# Patient Record
Sex: MALE | Race: WHITE | NOT HISPANIC OR LATINO | Employment: FULL TIME | URBAN - METROPOLITAN AREA
[De-identification: names, ages, dates, MRNs, and addresses within clinical notes are randomized per-mention and may not be internally consistent; named-entity substitution may affect disease eponyms.]

---

## 2017-02-06 RX ORDER — LISINOPRIL 5 MG/1
5 TABLET ORAL DAILY
COMMUNITY
End: 2018-06-29 | Stop reason: SDUPTHER

## 2017-02-07 ENCOUNTER — ANESTHESIA EVENT (OUTPATIENT)
Dept: GASTROENTEROLOGY | Facility: MEDICAL CENTER | Age: 57
End: 2017-02-07
Payer: COMMERCIAL

## 2017-02-08 ENCOUNTER — ANESTHESIA (OUTPATIENT)
Dept: GASTROENTEROLOGY | Facility: MEDICAL CENTER | Age: 57
End: 2017-02-08
Payer: COMMERCIAL

## 2017-02-08 ENCOUNTER — GENERIC CONVERSION - ENCOUNTER (OUTPATIENT)
Dept: GASTROENTEROLOGY | Facility: MEDICAL CENTER | Age: 57
End: 2017-02-08

## 2017-02-08 ENCOUNTER — HOSPITAL ENCOUNTER (OUTPATIENT)
Facility: MEDICAL CENTER | Age: 57
Setting detail: OUTPATIENT SURGERY
Discharge: HOME | End: 2017-02-08
Attending: INTERNAL MEDICINE | Admitting: INTERNAL MEDICINE
Payer: COMMERCIAL

## 2017-02-08 VITALS
TEMPERATURE: 97.4 F | BODY MASS INDEX: 29.4 KG/M2 | OXYGEN SATURATION: 96 % | SYSTOLIC BLOOD PRESSURE: 117 MMHG | DIASTOLIC BLOOD PRESSURE: 79 MMHG | RESPIRATION RATE: 16 BRPM | HEART RATE: 73 BPM | HEIGHT: 71 IN | WEIGHT: 210 LBS

## 2017-02-08 DIAGNOSIS — Z12.11 COLON CANCER SCREENING: Primary | ICD-10-CM

## 2017-02-08 RX ORDER — SODIUM CHLORIDE 9 MG/ML
125 INJECTION, SOLUTION INTRAVENOUS CONTINUOUS
Status: DISCONTINUED | OUTPATIENT
Start: 2017-02-08 | End: 2017-02-08 | Stop reason: HOSPADM

## 2017-02-08 RX ORDER — ONDANSETRON 2 MG/ML
4 INJECTION INTRAMUSCULAR; INTRAVENOUS ONCE
Status: DISCONTINUED | OUTPATIENT
Start: 2017-02-08 | End: 2017-02-08 | Stop reason: HOSPADM

## 2017-02-08 RX ORDER — PROPOFOL 10 MG/ML
INJECTION, EMULSION INTRAVENOUS AS NEEDED
Status: DISCONTINUED | OUTPATIENT
Start: 2017-02-08 | End: 2017-02-08 | Stop reason: SURG

## 2017-02-08 RX ADMIN — PROPOFOL 100 MG: 10 INJECTION, EMULSION INTRAVENOUS at 10:03

## 2017-02-08 RX ADMIN — PROPOFOL 50 MG: 10 INJECTION, EMULSION INTRAVENOUS at 10:16

## 2017-02-08 RX ADMIN — SODIUM CHLORIDE 125 ML/HR: 0.9 INJECTION, SOLUTION INTRAVENOUS at 09:21

## 2017-02-08 RX ADMIN — PROPOFOL 25 MG: 10 INJECTION, EMULSION INTRAVENOUS at 10:08

## 2017-02-08 RX ADMIN — PROPOFOL 25 MG: 10 INJECTION, EMULSION INTRAVENOUS at 10:14

## 2017-02-08 RX ADMIN — PROPOFOL 25 MG: 10 INJECTION, EMULSION INTRAVENOUS at 10:10

## 2017-02-08 RX ADMIN — PROPOFOL 25 MG: 10 INJECTION, EMULSION INTRAVENOUS at 10:12

## 2017-02-08 RX ADMIN — LIDOCAINE HYDROCHLORIDE 60 MG: 20 INJECTION, SOLUTION INTRAVENOUS at 10:03

## 2017-02-08 RX ADMIN — PROPOFOL 50 MG: 10 INJECTION, EMULSION INTRAVENOUS at 10:06

## 2017-06-07 ENCOUNTER — ALLSCRIPTS OFFICE VISIT (OUTPATIENT)
Dept: OTHER | Facility: OTHER | Age: 57
End: 2017-06-07

## 2017-06-07 ENCOUNTER — GENERIC CONVERSION - ENCOUNTER (OUTPATIENT)
Dept: OTHER | Facility: OTHER | Age: 57
End: 2017-06-07

## 2017-06-07 DIAGNOSIS — E78.5 HYPERLIPIDEMIA: ICD-10-CM

## 2017-06-07 DIAGNOSIS — R71.8 OTHER ABNORMALITY OF RED BLOOD CELLS: ICD-10-CM

## 2017-06-07 DIAGNOSIS — R94.31 ABNORMAL ELECTROCARDIOGRAM: ICD-10-CM

## 2017-06-12 ENCOUNTER — TRANSCRIBE ORDERS (OUTPATIENT)
Dept: ADMINISTRATIVE | Facility: HOSPITAL | Age: 57
End: 2017-06-12

## 2017-06-12 DIAGNOSIS — R94.31 NONSPECIFIC ABNORMAL ELECTROCARDIOGRAM (ECG) (EKG): Primary | ICD-10-CM

## 2017-07-06 ENCOUNTER — HOSPITAL ENCOUNTER (OUTPATIENT)
Dept: NON INVASIVE DIAGNOSTICS | Facility: CLINIC | Age: 57
Discharge: HOME | End: 2017-07-06
Payer: COMMERCIAL

## 2017-07-06 DIAGNOSIS — R94.31 NONSPECIFIC ABNORMAL ELECTROCARDIOGRAM (ECG) (EKG): ICD-10-CM

## 2017-07-06 LAB
ARRHY DURING EX: NORMAL
CHEST PAIN STATEMENT: NORMAL
MAX DIASTOLIC BP: 108 MMHG
MAX HEART RATE: 181 BPM
MAX PREDICTED HEART RATE: 166 BPM
MAX. SYSTOLIC BP: 200 MMHG
PROTOCOL NAME: NORMAL
REASON FOR TERMINATION: NORMAL
TARGET HR FORMULA: NORMAL
TIME IN EXERCISE PHASE: 583 S

## 2017-07-06 PROCEDURE — 93350 STRESS TTE ONLY: CPT

## 2017-07-27 ENCOUNTER — GENERIC CONVERSION - ENCOUNTER (OUTPATIENT)
Dept: OTHER | Facility: OTHER | Age: 57
End: 2017-07-27

## 2017-10-25 ENCOUNTER — GENERIC CONVERSION - ENCOUNTER (OUTPATIENT)
Dept: OTHER | Facility: OTHER | Age: 57
End: 2017-10-25

## 2017-10-25 DIAGNOSIS — D56.9 THALASSEMIA: ICD-10-CM

## 2017-10-25 DIAGNOSIS — I10 ESSENTIAL (PRIMARY) HYPERTENSION: ICD-10-CM

## 2017-10-25 DIAGNOSIS — R42 DIZZINESS AND GIDDINESS: ICD-10-CM

## 2018-01-14 VITALS
HEART RATE: 64 BPM | WEIGHT: 223.25 LBS | HEIGHT: 71 IN | OXYGEN SATURATION: 98 % | TEMPERATURE: 97.6 F | DIASTOLIC BLOOD PRESSURE: 76 MMHG | SYSTOLIC BLOOD PRESSURE: 114 MMHG | BODY MASS INDEX: 31.25 KG/M2

## 2018-01-22 VITALS
SYSTOLIC BLOOD PRESSURE: 120 MMHG | HEART RATE: 90 BPM | TEMPERATURE: 97.9 F | WEIGHT: 222 LBS | DIASTOLIC BLOOD PRESSURE: 84 MMHG | HEIGHT: 71 IN | OXYGEN SATURATION: 98 % | BODY MASS INDEX: 31.08 KG/M2

## 2018-04-11 ENCOUNTER — OFFICE VISIT (OUTPATIENT)
Dept: INTERNAL MEDICINE CLINIC | Facility: CLINIC | Age: 58
End: 2018-04-11
Payer: COMMERCIAL

## 2018-04-11 VITALS
HEIGHT: 72 IN | HEART RATE: 64 BPM | BODY MASS INDEX: 30.5 KG/M2 | WEIGHT: 225.2 LBS | DIASTOLIC BLOOD PRESSURE: 84 MMHG | SYSTOLIC BLOOD PRESSURE: 122 MMHG | OXYGEN SATURATION: 99 % | TEMPERATURE: 97.5 F

## 2018-04-11 DIAGNOSIS — D56.9 THALASSEMIA, UNSPECIFIED TYPE: ICD-10-CM

## 2018-04-11 DIAGNOSIS — D69.6 THROMBOCYTOPENIA (HCC): ICD-10-CM

## 2018-04-11 DIAGNOSIS — Z11.59 NEED FOR HEPATITIS C SCREENING TEST: Primary | ICD-10-CM

## 2018-04-11 DIAGNOSIS — E66.9 OBESITY (BMI 30.0-34.9): ICD-10-CM

## 2018-04-11 DIAGNOSIS — I10 ESSENTIAL HYPERTENSION: ICD-10-CM

## 2018-04-11 DIAGNOSIS — R79.89 HIGH SERUM HIGH DENSITY LIPOPROTEIN (HDL): ICD-10-CM

## 2018-04-11 PROBLEM — J06.9 ACUTE URI: Status: RESOLVED | Noted: 2017-10-25 | Resolved: 2018-04-11

## 2018-04-11 PROBLEM — R94.31 ST SEGMENT CHANGES ON ELECTROCARDIOGRAM: Status: RESOLVED | Noted: 2017-06-07 | Resolved: 2018-04-11

## 2018-04-11 PROBLEM — J06.9 ACUTE URI: Status: ACTIVE | Noted: 2017-10-25

## 2018-04-11 PROBLEM — R71.8 MICROCYTOSIS: Status: ACTIVE | Noted: 2017-06-07

## 2018-04-11 PROBLEM — R42 VERTIGO: Status: RESOLVED | Noted: 2017-10-25 | Resolved: 2018-04-11

## 2018-04-11 PROBLEM — R00.1 SINUS BRADYCARDIA: Status: ACTIVE | Noted: 2017-06-07

## 2018-04-11 PROBLEM — E66.811 OBESITY (BMI 30.0-34.9): Status: ACTIVE | Noted: 2018-04-11

## 2018-04-11 PROBLEM — J30.2 SEASONAL ALLERGIES: Status: ACTIVE | Noted: 2017-10-25

## 2018-04-11 PROBLEM — R00.1 SINUS BRADYCARDIA: Status: RESOLVED | Noted: 2017-06-07 | Resolved: 2018-04-11

## 2018-04-11 PROBLEM — R94.31 ST SEGMENT CHANGES ON ELECTROCARDIOGRAM: Status: ACTIVE | Noted: 2017-06-07

## 2018-04-11 PROBLEM — R42 VERTIGO: Status: ACTIVE | Noted: 2017-10-25

## 2018-04-11 PROCEDURE — 99214 OFFICE O/P EST MOD 30 MIN: CPT | Performed by: PHYSICIAN ASSISTANT

## 2018-04-11 RX ORDER — FLUTICASONE PROPIONATE 50 MCG
1 SPRAY, SUSPENSION (ML) NASAL DAILY
COMMUNITY
Start: 2017-10-25 | End: 2019-05-31 | Stop reason: ALTCHOICE

## 2018-04-11 NOTE — ASSESSMENT & PLAN NOTE
Patient with a history of thalassemia  Microcytosis without anemia  Platelets trending down slightly    Will obtain follow-up CBC with next lab set

## 2018-04-11 NOTE — ASSESSMENT & PLAN NOTE
Slight decreased platelets  Will check CBC with next labs that  No active signs or symptoms of bleed at this time

## 2018-04-11 NOTE — PROGRESS NOTES
[facility] [address] [place] [address]  Standard Office Visit  Patient ID: Sarahy Wells    : 1960  Age/Gender: 56 y o  male     DATE: 2018      Assessment/Plan:    Essential hypertension  At this time blood pressure is well controlled on lisinopril 5 mg daily  No changes to blood pressure medicines  Continue with low-salt diet and daily activity  Six-month follow-up with labs to be done prior    High serum high density lipoprotein (HDL)   Patient has a documented history of hyperlipidemia however his elevated total cholesterol is likely secondary to his increased HDL which is protest active to him  Will check lipids with next lab set no adjustment needed at this time  Continue with current low-fat diet and daily activity  Need for hepatitis C screening test    Due to patient's age being a baby boomer will check hep C screening antibody    Thalassemia   Patient with a history of thalassemia  Microcytosis without anemia  Platelets trending down slightly  Will obtain follow-up CBC with next lab set    Thrombocytopenia ([address] )   Slight decreased platelets  Will check CBC with next labs that  No active signs or symptoms of bleed at this time  Diagnoses and all orders for this visit:    Need for hepatitis C screening test  -     Hepatitis C antibody; Future    Thalassemia, unspecified type    High serum high density lipoprotein (HDL)  -     Comprehensive metabolic panel; Future  -     Lipid panel; Future    Essential hypertension  -     Comprehensive metabolic panel;  Future  -     CBC and differential; Future  -     Urinalysis with microscopic    Thrombocytopenia (HCC)  -     CBC and differential; Future    Obesity (BMI 30 0-34 9)    Other orders  -     fluticasone (FLONASE) 50 mcg/act nasal spray; 1 spray into each nostril daily          Subjective:   Chief Complaint   Patient presents with    Follow-up     blood work done 2018   Vicky Titus Hypertension         Tommy Gonsalves is a 56 y o  male who presents to the office on 4/11/2018 for     72-year-old male for follow-up  No complaints or concerns at this time  Notes he has continues to work full time  Has been more careful trying to get in and out of his truck but less stress on his knees  Notes his knee pain is well controlled  Taking medications as directed  Has had a recent colonoscopy  Had labs prior to today's visit  No new complaints or concerns at this time  Here today follow-up labs  Notes he is not interested in any vaccinations  Historically refuses vaccinations  Had echo stress test 07/2017 which was negative  History of ventricular peritoneal shunt  No headaches no dizziness  No imbalance issues  He notes he has been doing well  Hypertension   This is a chronic problem  The current episode started more than 1 year ago  The problem is controlled  Pertinent negatives include no anxiety, blurred vision, chest pain, headaches, palpitations, peripheral edema or shortness of breath  There are no associated agents to hypertension  Risk factors for coronary artery disease include dyslipidemia, family history, male gender and obesity  Past treatments include ACE inhibitors  There are no compliance problems  Heartburn   He reports no abdominal pain, no chest pain, no coughing, no heartburn, no nausea or no wheezing  Symptoms have fully resolved  Have not returned  Manages with diet and lifestyle modifications alone  Notes he previously did have symptoms but it was when he was drinking more than a pot of coffee a day  Resolved with diet changes          The following portions of the patient's history were reviewed and updated as appropriate: allergies, current medications, past family history, past medical history, past social history, past surgical history and problem list     Review of Systems   Eyes: Negative for blurred vision     Respiratory: Negative for cough, shortness of breath and wheezing  Cardiovascular: Negative for chest pain and palpitations  Gastrointestinal: Negative for abdominal pain, constipation, diarrhea, heartburn, nausea and vomiting  Genitourinary:        No urinary symptoms  Wakes on occasion at night if she drinks fluids before bed  No straining with urination  No pain with urination  Normal stream    Musculoskeletal:        Intermittent knee pain   Skin:        No bruising or bleeding   Neurological: Negative for dizziness, syncope, light-headedness, numbness and headaches  Patient Active Problem List   Diagnosis    Essential hypertension    High serum high density lipoprotein (HDL)    Hypopigmentation    Microcytosis    Seasonal allergies    Thalassemia    Need for hepatitis C screening test    Thrombocytopenia (HCC)    Obesity (BMI 30 0-34  9)       Past Medical History:   Diagnosis Date    Allergic     Allergic rhinitis     GERD (gastroesophageal reflux disease)     Hydrocephalus     Hypertension     Obesity (BMI 30 0-34  9) 4/11/2018    Thalassemia 10/25/2017       Past Surgical History:   Procedure Laterality Date    AL COLONOSCOPY FLX DX W/COLLJ SPEC WHEN PFRMD N/A 2/8/2017    Procedure: COLONOSCOPY;  Surgeon: Hezzie Cranker, MD;  Location: [facility];   Service: Gastroenterology    SHOULDER SURGERY      last assessed 3/8/2016    VENTRICULAR ATRIAL SHUNT      for hydrocephalus    VENTRICULOPERITONEAL SHUNT           Current Outpatient Prescriptions:     fluticasone (FLONASE) 50 mcg/act nasal spray, 1 spray into each nostril daily, Disp: , Rfl:     lisinopril (ZESTRIL) 5 mg tablet, Take 5 mg by mouth daily, Disp: , Rfl:     Allergies   Allergen Reactions    Pollen Extract        Social History     Social History    Marital status: /Civil Union     Spouse name: N/A    Number of children: N/A    Years of education: N/A     Social History Main Topics    Smoking status: Never Smoker    Smokeless tobacco: Never Used    Alcohol use Yes      Comment: occasional    Drug use: No    Sexual activity: Not Asked     Other Topics Concern    None     Social History Narrative    Activities:  Soccer       Family History   Problem Relation Age of Onset    Diabetes Mother      DM    Diabetes Father      DM    Hypertension Father     Diabetes Brother      DM    Heart disease Brother      problem    Liver disease Family      sibling    Lung cancer Family      Uncle    Diabetes type II Brother     Cirrhosis Brother     Diabetes type II Brother     Dialysis Brother        PHQ-9 Depression Screening    PHQ-9:    Frequency of the following problems over the past two weeks:              Health Maintenance   Topic Date Due    HIV SCREENING  1960    Hepatitis C Screening  1960    Depression Screening PHQ-9  06/07/2018    COLONOSCOPY  02/08/2022    DTaP,Tdap,and Td Vaccines (2 - Td) 04/11/2028    INFLUENZA VACCINE  Addressed       There is no immunization history for the selected administration types on file for this patient  Objective:  Vitals:    04/11/18 1242 04/11/18 1253   BP: 122/84    BP Location: Left arm    Patient Position: Sitting    Cuff Size: Large    Pulse: 64    Temp: 97 9 °F (36 6 °C) 97 5 °F (36 4 °C)   TempSrc: Oral    SpO2: 99%    Weight: 102 kg (225 lb 3 2 oz)    Height: 6' (1 829 m)      Wt Readings from Last 3 Encounters:   04/11/18 102 kg (225 lb 3 2 oz)   10/25/17 101 kg (222 lb)   06/07/17 101 kg (223 lb 4 oz)     Body mass index is 30 54 kg/m²  No exam data present       Physical Exam   Constitutional: He is oriented to person, place, and time  He appears well-developed and well-nourished  No distress  Alert pleasant cooperative seated in room in no acute distress   HENT:   Head: Normocephalic and atraumatic  Mouth/Throat: Oropharynx is clear and moist  No oropharyngeal exudate     Right parietal area  shunt site normal overlying skin, nontender   Eyes: Pupils are equal, round, and reactive to light  Right eye exhibits no discharge  Left eye exhibits no discharge  No scleral icterus  Pupils reactive   Neck: Neck supple  Cardiovascular: Normal rate, regular rhythm and normal heart sounds  No murmur heard  Pulmonary/Chest: Effort normal and breath sounds normal  No respiratory distress  He has no wheezes  Clear to auscultation no crackles rhonchi wheeze no respiratory distress   Abdominal: Soft  Bowel sounds are normal  There is no tenderness  There is no guarding  Positive bowel sounds soft non-tender nondistended   Musculoskeletal: He exhibits no edema  No lower extremity edema   Neurological: He is alert and oriented to person, place, and time  No gross focal neurologic deficits   Skin: Skin is warm and dry  He is not diaphoretic  Hypopigmented skin lesions bilateral hands and forearm  Psychiatric: He has a normal mood and affect  His behavior is normal  Judgment and thought content normal    Nursing note and vitals reviewed            Future Appointments  Date Time Provider Chaparrita Valdez   10/10/2018 9:00 AM Sheila Shoemaker PA-C [address] [place] [address] CARE [address] [place] [address]    Patient Care Team:  Neo Moon MD as PCP - General  Sosa Whipple MD

## 2018-04-11 NOTE — ASSESSMENT & PLAN NOTE
At this time blood pressure is well controlled on lisinopril 5 mg daily  No changes to blood pressure medicines  Continue with low-salt diet and daily activity    Six-month follow-up with labs to be done prior

## 2018-04-11 NOTE — PATIENT INSTRUCTIONS
Essential hypertension  At this time blood pressure is well controlled on lisinopril 5 mg daily  No changes to blood pressure medicines  Continue with low-salt diet and daily activity  Six-month follow-up with labs to be done prior    High serum high density lipoprotein (HDL)   Patient has a documented history of hyperlipidemia however his elevated total cholesterol is likely secondary to his increased HDL which is protest active to him  Will check lipids with next lab set no adjustment needed at this time  Continue with current low-fat diet and daily activity  Need for hepatitis C screening test    Due to patient's age being a baby boomer will check hep C screening antibody    Thalassemia   Patient with a history of thalassemia  Microcytosis without anemia  Platelets trending down slightly  Will obtain follow-up CBC with next lab set    Thrombocytopenia ([address] )   Slight decreased platelets  Will check CBC with next labs that  No active signs or symptoms of bleed at this time

## 2018-04-11 NOTE — ASSESSMENT & PLAN NOTE
Patient has a documented history of hyperlipidemia however his elevated total cholesterol is likely secondary to his increased HDL which is protest active to him  Will check lipids with next lab set no adjustment needed at this time  Continue with current low-fat diet and daily activity

## 2018-06-25 RX ORDER — LISINOPRIL 5 MG/1
TABLET ORAL
Qty: 30 TABLET | Refills: 5 | OUTPATIENT
Start: 2018-06-25

## 2018-06-29 DIAGNOSIS — I10 HYPERTENSION, UNSPECIFIED TYPE: Primary | ICD-10-CM

## 2018-06-29 RX ORDER — LISINOPRIL 5 MG/1
5 TABLET ORAL DAILY
Qty: 30 TABLET | Refills: 5 | Status: SHIPPED | OUTPATIENT
Start: 2018-06-29 | End: 2018-08-24 | Stop reason: SDUPTHER

## 2018-08-24 ENCOUNTER — OFFICE VISIT (OUTPATIENT)
Dept: INTERNAL MEDICINE CLINIC | Facility: CLINIC | Age: 58
End: 2018-08-24
Payer: COMMERCIAL

## 2018-08-24 VITALS
TEMPERATURE: 97.4 F | BODY MASS INDEX: 31.64 KG/M2 | HEART RATE: 96 BPM | OXYGEN SATURATION: 97 % | DIASTOLIC BLOOD PRESSURE: 92 MMHG | WEIGHT: 221 LBS | HEIGHT: 70 IN | SYSTOLIC BLOOD PRESSURE: 142 MMHG

## 2018-08-24 DIAGNOSIS — E66.9 OBESITY (BMI 30.0-34.9): Primary | ICD-10-CM

## 2018-08-24 DIAGNOSIS — I10 ESSENTIAL HYPERTENSION: ICD-10-CM

## 2018-08-24 DIAGNOSIS — I10 HYPERTENSION, UNSPECIFIED TYPE: ICD-10-CM

## 2018-08-24 PROCEDURE — 99213 OFFICE O/P EST LOW 20 MIN: CPT | Performed by: PHYSICIAN ASSISTANT

## 2018-08-24 RX ORDER — LISINOPRIL 10 MG/1
10 TABLET ORAL DAILY
Qty: 30 TABLET | Refills: 3 | Status: SHIPPED | OUTPATIENT
Start: 2018-08-24 | End: 2018-08-29 | Stop reason: SDUPTHER

## 2018-08-24 NOTE — ASSESSMENT & PLAN NOTE
Up until today blood pressure was previously well controlled with lisinopril 5 mg daily  He notes he has gained weight due to inactivity over the last few months and has been consuming large amounts of salt  He also has increased stress with upcoming trip to [facility]  Blood pressure was elevated at DO T physical as well as home blood pressure readings  He notes when he checks his blood pressure at home his blood pressure is around 140/mid 80's  Patient currently without any symptoms  Due to previous good control and multiple lifestyle modifications patient has already made, will increase lisinopril form 5 mg daily to 10 mg daily  Until you  new script, take two 5 mg tablets to make 10 mg dose  New script will be for 10 mg tablets and you will only need to take one of these  Discussed importance of low salt diet, daily activity  Monitor BP at home (patient has blood pressure cuff)  Avoid caffeine and stimulants  Discussed importance of  Getting sufficient amount of sleep per day  Keep blood pressure log of home numbers  Goal of BP is <130/<80  Discussed proper BP technique  Labs  were already ordered for 10/2018 visit and patient has script for this with  Advised him to go early next week for these labs before his appointment on Wednesday  Patient will follow up next week to review blood pressure log and consider possible further lisinopril dose adjustment pending log  Discussed red flag sx which need immediate eval and treat

## 2018-08-24 NOTE — PROGRESS NOTES
[facility] [address] [place] [address]  Standard Office Visit  Patient ID: Hansa Harrington    : 1960  Age/Gender: 56 y o  male     DATE: 2018      Assessment/Plan:    Essential hypertension   Up until today blood pressure was previously well controlled with lisinopril 5 mg daily  He notes he has gained weight due to inactivity over the last few months and has been consuming large amounts of salt  He also has increased stress with upcoming trip to [facility]  Blood pressure was elevated at DO T physical as well as home blood pressure readings  He notes when he checks his blood pressure at home his blood pressure is around 140/mid 80's  Patient currently without any symptoms  Due to previous good control and multiple lifestyle modifications patient has already made, will increase lisinopril form 5 mg daily to 10 mg daily  Until you  new script, take two 5 mg tablets to make 10 mg dose  New script will be for 10 mg tablets and you will only need to take one of these  Discussed importance of low salt diet, daily activity  Monitor BP at home (patient has blood pressure cuff)  Avoid caffeine and stimulants  Discussed importance of  Getting sufficient amount of sleep per day  Keep blood pressure log of home numbers  Goal of BP is <130/<80  Discussed proper BP technique  Labs  were already ordered for 10/2018 visit and patient has script for this with  Advised him to go early next week for these labs before his appointment on Wednesday  Patient will follow up next week to review blood pressure log and consider possible further lisinopril dose adjustment pending log  Discussed red flag sx which need immediate eval and treat  Diagnoses and all orders for this visit:    Obesity (BMI 30 0-34  9)    Essential hypertension    Hypertension, unspecified type  -     lisinopril (ZESTRIL) 10 mg tablet;  Take 1 tablet (10 mg total) by mouth daily Subjective:   Chief Complaint   Patient presents with    Hypertension     had DOT PE completed          Pedro Prado is a 56 y o  male who presents to the office on 8/24/2018 for     For follow up  Was for DOT physcial and was told that his BP is elevated  He notes he does not feel any different  He notes that he came today right from work  He notes that prior visit for DOT was about 2 weeks ago, 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  Wants to know if this played a role in BP being elevated  Has been eating a lot of olives as of late  Since last 2 weeks has been trying to reduce his BP with lifestyle changes, reduced salt in his diet  Previously put salt on anything  Does not know what his BP was a this DOT physical   Reduced coffee to 1 cup per day, decafe  He notes he is afraid of flying and notes he ahs a flight coming up 2 weeks from now  He notes the flight is always on his mind  Has been on lisinopril 5mg daily for last few years  Less active since shoulder surgery (left)  No new medications, herbals or supplements  He notes he used to play soccer several times a week and has gotten away from this as of late  Plans to eat more healthy and trying get back to his regular body weight  Has gained weight due to inactivity      Hypertension   This is a chronic problem  The current episode started more than 1 year ago  The problem has been gradually worsening since onset  The problem is uncontrolled  Pertinent negatives include no blurred vision, chest pain, headaches, malaise/fatigue, neck pain, palpitations, peripheral edema or shortness of breath  Risk factors for coronary artery disease include family history, male gender and obesity  Past treatments include ACE inhibitors  Compliance problems include diet and exercise  There is no history of angina, kidney disease, CAD/MI, CVA, heart failure or left ventricular hypertrophy   There is no history of a hypertension causing med  The following portions of the patient's history were reviewed and updated as appropriate: allergies, current medications, past family history, past medical history, past social history, past surgical history and problem list     Review of Systems   Constitutional: Negative for malaise/fatigue  Eyes: Negative for blurred vision  Respiratory: Negative for shortness of breath and wheezing  Cardiovascular: Negative for chest pain and palpitations  Gastrointestinal: Negative for abdominal pain, constipation, diarrhea, nausea and vomiting  Musculoskeletal: Negative for neck pain  Skin:        Has unchanged hypopigmentation on arms for some time, dx with vitelago   Neurological: Negative for dizziness, tremors, syncope, light-headedness, numbness and headaches  Patient Active Problem List   Diagnosis    Essential hypertension    High serum high density lipoprotein (HDL)    Hypopigmentation    Microcytosis    Seasonal allergies    Thalassemia    Need for hepatitis C screening test    Thrombocytopenia (HCC)    Obesity (BMI 30 0-34  9)       Past Medical History:   Diagnosis Date    Allergic     Allergic rhinitis     GERD (gastroesophageal reflux disease)     Hydrocephalus     Hypertension     Obesity (BMI 30 0-34  9) 4/11/2018    Thalassemia 10/25/2017       Past Surgical History:   Procedure Laterality Date    DE COLONOSCOPY FLX DX W/COLLJ SPEC WHEN PFRMD N/A 2/8/2017    Procedure: COLONOSCOPY;  Surgeon: Abigail De La Cruz MD;  Location: [facility] GI LAB;   Service: Gastroenterology    SHOULDER SURGERY      last assessed 3/8/2016    VENTRICULAR ATRIAL SHUNT      for hydrocephalus    VENTRICULOPERITONEAL SHUNT           Current Outpatient Prescriptions:     lisinopril (ZESTRIL) 10 mg tablet, Take 1 tablet (10 mg total) by mouth daily, Disp: 30 tablet, Rfl: 3    fluticasone (FLONASE) 50 mcg/act nasal spray, 1 spray into each nostril daily, Disp: , Rfl:     Allergies   Allergen Reactions    Pollen Extract        Social History     Social History    Marital status: /Civil Union     Spouse name: N/A    Number of children: N/A    Years of education: N/A     Social History Main Topics    Smoking status: Never Smoker    Smokeless tobacco: Never Used    Alcohol use Yes      Comment: occasional    Drug use: No    Sexual activity: Not Asked     Other Topics Concern    None     Social History Narrative    Activities:  Soccer       Family History   Problem Relation Age of Onset    Diabetes Mother         DM    Diabetes Father         DM    Hypertension Father     Diabetes Brother         DM    Heart disease Brother         problem    Liver disease Family         sibling    Lung cancer Family         Uncle    Diabetes type II Brother     Cirrhosis Brother     Diabetes type II Brother     Dialysis Brother        PHQ-9 Depression Screening    PHQ-9:    Frequency of the following problems over the past two weeks:              Health Maintenance   Topic Date Due    Pneumococcal PPSV23 Highest Risk Adult (1 of 3 - PCV13) 11/22/1981    Depression Screening PHQ-9  06/07/2018    INFLUENZA VACCINE  09/01/2018    CRC Screening: Colonoscopy  02/08/2022    DTaP,Tdap,and Td Vaccines (2 - Td) 04/11/2028       There is no immunization history for the selected administration types on file for this patient       Objective:  Vitals:    08/24/18 1149 08/24/18 1203 08/24/18 1219 08/24/18 1220   BP: (!) 148/102 152/98 146/64 142/92   BP Location: Right arm Left arm Left arm Right arm   Patient Position: Sitting Sitting Sitting Sitting   Cuff Size: Adult Standard Extra-Large Extra-Large   Pulse: 96      Temp: (!) 97 4 °F (36 3 °C)      TempSrc: Tympanic      SpO2: 97%      Weight: 100 kg (221 lb)      Height: 5' 10" (1 778 m)        Wt Readings from Last 3 Encounters:   08/24/18 100 kg (221 lb)   04/11/18 102 kg (225 lb 3 2 oz)   10/25/17 101 kg (222 lb)     Body mass index is 31 71 kg/m²  No exam data present       Physical Exam   Constitutional: He is oriented to person, place, and time  He appears well-developed and well-nourished  No distress  Alert pleasant cooperative  Seated in room in no acute distress   HENT:   Head: Normocephalic and atraumatic  Right Ear: External ear normal    Left Ear: External ear normal    Mouth/Throat: Oropharynx is clear and moist  No oropharyngeal exudate  Right scalp  shunt in place  Nontender   Eyes: Conjunctivae are normal  Pupils are equal, round, and reactive to light  Right eye exhibits no discharge  Left eye exhibits no discharge  No scleral icterus  Pupils reactive   Neck: Neck supple  Cardiovascular: Normal rate, regular rhythm and normal heart sounds  No murmur heard  Pulmonary/Chest: Effort normal and breath sounds normal  No respiratory distress  He has no wheezes  He has no rales  Clear to auscultation throughout  No crackles no rhonchi no wheeze  No respiratory distress   Abdominal: Soft  Bowel sounds are normal  There is no tenderness  Soft nontender nondistended  Positive bowel sounds  No bruits   Musculoskeletal: He exhibits no edema  Neurological: He is alert and oriented to person, place, and time  No gross focal neurologic deficits on exam   Skin: Skin is warm and dry  He is not diaphoretic  Hypopigmented lesion on  Skin bilateral upper extremities   Psychiatric: He has a normal mood and affect  His behavior is normal  Thought content normal    Nursing note and vitals reviewed  Future Appointments  Date Time Provider Chaparrita Valdez   8/29/2018 9:00 AM Jessica [facility] [address]   10/12/2018 1:15 PM Umang Rader PA-C [address] [place] [address]    Patient Care Team:  Petey Calvo MD as PCP - General  Jimenez Jaquez MD

## 2018-08-24 NOTE — PATIENT INSTRUCTIONS
Essential hypertension  Increase lisinopril form 5 mg daily to 10 mg daily  Until you  new script, take two 5 mg tablets to make 10 mg dose  New script will be for 10 mg tablets and you will only need to take one of these  Discussed importance of low salt diet, daily activity  Monitor BP at home  Avoid caffeine and stimulants  Discussed importance of rest   Monitor BP at home  Goal of BP is <130/<80  Labs already ordered for 10/2018 visit  Advised him to go early next week for these labs before his appointment on Wednesday

## 2018-08-29 ENCOUNTER — OFFICE VISIT (OUTPATIENT)
Dept: INTERNAL MEDICINE CLINIC | Facility: CLINIC | Age: 58
End: 2018-08-29
Payer: COMMERCIAL

## 2018-08-29 VITALS
OXYGEN SATURATION: 99 % | HEIGHT: 70 IN | TEMPERATURE: 97.8 F | SYSTOLIC BLOOD PRESSURE: 138 MMHG | HEART RATE: 61 BPM | WEIGHT: 219.2 LBS | BODY MASS INDEX: 31.38 KG/M2 | DIASTOLIC BLOOD PRESSURE: 82 MMHG

## 2018-08-29 DIAGNOSIS — I10 HYPERTENSION, UNSPECIFIED TYPE: ICD-10-CM

## 2018-08-29 DIAGNOSIS — I10 ESSENTIAL HYPERTENSION: Primary | ICD-10-CM

## 2018-08-29 PROCEDURE — 3075F SYST BP GE 130 - 139MM HG: CPT | Performed by: NURSE PRACTITIONER

## 2018-08-29 PROCEDURE — 3079F DIAST BP 80-89 MM HG: CPT | Performed by: NURSE PRACTITIONER

## 2018-08-29 PROCEDURE — 99213 OFFICE O/P EST LOW 20 MIN: CPT | Performed by: NURSE PRACTITIONER

## 2018-08-29 RX ORDER — LISINOPRIL 10 MG/1
10 TABLET ORAL DAILY
Qty: 30 TABLET | Refills: 5 | Status: SHIPPED | OUTPATIENT
Start: 2018-08-29 | End: 2019-01-07 | Stop reason: SDUPTHER

## 2018-08-29 NOTE — ASSESSMENT & PLAN NOTE
Patient is to continue to take lisinopril 10 mg tablet daily, manual blood pressure 130/80  Patient will be leaving for vacation  And want to hold off on increasing blood pressure medications at this time  Will consider increasing lisinopril to 20 mg at follow-up appointment in 2 months if patient's blood pressure is elevated  Discussed and symptoms of stroke with patient and when he should report to the emergency room  Continue to monitor blood pressure at home  Goal BP is < 130/80  Contact our office for consistent elevations  Recommend low sodium diet  Exercise 30 minutes three times a week as tolerated    Recommend yearly eye exam

## 2018-08-29 NOTE — PROGRESS NOTES
Assessment/Plan:    Essential hypertension   Patient is to continue to take lisinopril 10 mg tablet daily, manual blood pressure 130/80  Patient will be leaving for vacation  And want to hold off on increasing blood pressure medications at this time  Will consider increasing lisinopril to 20 mg at follow-up appointment in 2 months if patient's blood pressure is elevated  Discussed and symptoms of stroke with patient and when he should report to the emergency room  Continue to monitor blood pressure at home  Goal BP is < 130/80  Contact our office for consistent elevations  Recommend low sodium diet  Exercise 30 minutes three times a week as tolerated  Recommend yearly eye exam           Diagnoses and all orders for this visit:    Essential hypertension    Hypertension, unspecified type  -     lisinopril (ZESTRIL) 10 mg tablet; Take 1 tablet (10 mg total) by mouth daily          Subjective:      Patient ID: Lawyer Severs is a 56 y o  male  Patient presents today for follow-up on his blood pressure  Patient was seen on 08/24/2018 by Lukasz Quiroz, who increased the patient's lisinopril from 5 mg to 10 mg  Patient has been taking 10 mg the past few days, and did monitor his blood pressures at home he got readings of 127/84 and 135/82  Manual blood pressure in the office today 130/80  patient does state that he has been gaining weight due to inactivity over the past 2 months, as his job has been consuming most of his time  Patient had also reports consuming large amounts of salt  He also states that he has increased stress due to an upcoming trip to [facility]  Hypertension   This is a chronic problem  The current episode started more than 1 year ago  The problem has been waxing and waning since onset  The problem is controlled  Pertinent negatives include no anxiety, blurred vision, chest pain, headaches, neck pain, orthopnea, palpitations, peripheral edema or shortness of breath   Risk factors for coronary artery disease include male gender, obesity and dyslipidemia  Treatments tried:   Lisinopril  The current treatment provides moderate improvement  Compliance problems include diet and exercise  The following portions of the patient's history were reviewed and updated as appropriate: allergies, current medications, past family history, past medical history, past social history, past surgical history and problem list     Review of Systems   Constitutional: Negative for activity change, appetite change, chills, diaphoresis and fever  HENT: Negative for congestion, ear discharge, ear pain, postnasal drip, rhinorrhea, sinus pain, sinus pressure and sore throat  Eyes: Negative for blurred vision, pain, discharge, itching and visual disturbance  Respiratory: Negative for cough, chest tightness, shortness of breath and wheezing  Cardiovascular: Negative for chest pain, palpitations, orthopnea and leg swelling  Gastrointestinal: Negative for abdominal pain, blood in stool, constipation, diarrhea, nausea and vomiting  Endocrine: Negative for polydipsia, polyphagia and polyuria  Genitourinary: Negative for difficulty urinating, dysuria, hematuria and urgency  Musculoskeletal: Negative for arthralgias, back pain and neck pain  Skin: Negative for rash and wound  Neurological: Negative for dizziness, weakness, numbness and headaches  Past Medical History:   Diagnosis Date    Allergic     Allergic rhinitis     GERD (gastroesophageal reflux disease)     Hydrocephalus     Hypertension     Obesity (BMI 30 0-34  9) 4/11/2018    Thalassemia 10/25/2017         Current Outpatient Prescriptions:     fluticasone (FLONASE) 50 mcg/act nasal spray, 1 spray into each nostril daily, Disp: , Rfl:     lisinopril (ZESTRIL) 10 mg tablet, Take 1 tablet (10 mg total) by mouth daily, Disp: 30 tablet, Rfl: 5    Allergies   Allergen Reactions    Pollen Extract        Social History Past Surgical History:   Procedure Laterality Date    NH COLONOSCOPY FLX DX W/COLLJ SPEC WHEN PFRMD N/A 2/8/2017    Procedure: COLONOSCOPY;  Surgeon: Nam Woo MD;  Location: [facility]; Service: Gastroenterology    SHOULDER SURGERY      last assessed 3/8/2016    VENTRICULAR ATRIAL SHUNT      for hydrocephalus    VENTRICULOPERITONEAL SHUNT       Family History   Problem Relation Age of Onset    Diabetes Mother         DM    Diabetes Father         DM    Hypertension Father     Diabetes Brother         DM    Heart disease Brother         problem    Liver disease Family         sibling    Lung cancer Family         Uncle    Diabetes type II Brother     Cirrhosis Brother     Diabetes type II Brother     Dialysis Brother        Objective:  /82 (BP Location: Left arm, Patient Position: Sitting, Cuff Size: Adult)   Pulse 61   Temp 97 8 °F (36 6 °C) (Oral)   Ht 5' 10" (1 778 m)   Wt 99 4 kg (219 lb 3 2 oz)   SpO2 99% Comment: room air  BMI 31 45 kg/m²     No results found for this or any previous visit (from the past 1344 hour(s))  Physical Exam   Constitutional: He is oriented to person, place, and time  He appears well-developed and well-nourished  No distress  HENT:   Head: Normocephalic and atraumatic  Right Ear: External ear normal    Left Ear: External ear normal    Nose: Nose normal    Mouth/Throat: Oropharynx is clear and moist  No oropharyngeal exudate  Eyes: Conjunctivae and EOM are normal  Pupils are equal, round, and reactive to light  Right eye exhibits no discharge  Left eye exhibits no discharge  Neck: Normal range of motion  Neck supple  No thyromegaly present  Cardiovascular: Normal rate, regular rhythm, normal heart sounds and intact distal pulses  Exam reveals no gallop and no friction rub  No murmur heard  Pulmonary/Chest: Effort normal and breath sounds normal  No stridor  No respiratory distress  He has no wheezes  He has no rales     Abdominal: Soft  Bowel sounds are normal  He exhibits no distension  There is no tenderness  Lymphadenopathy:     He has no cervical adenopathy  Neurological: He is alert and oriented to person, place, and time  Skin: Skin is warm and dry  No rash noted  He is not diaphoretic  No erythema  Hypopigmented skin to head and bilateral extremities   Psychiatric: He has a normal mood and affect   His behavior is normal  Judgment and thought content normal

## 2018-09-17 LAB — HCV AB SER-ACNC: NEGATIVE

## 2018-10-12 ENCOUNTER — TELEPHONE (OUTPATIENT)
Dept: INTERNAL MEDICINE CLINIC | Facility: CLINIC | Age: 58
End: 2018-10-12

## 2018-10-12 ENCOUNTER — OFFICE VISIT (OUTPATIENT)
Dept: INTERNAL MEDICINE CLINIC | Facility: CLINIC | Age: 58
End: 2018-10-12
Payer: COMMERCIAL

## 2018-10-12 VITALS
HEART RATE: 65 BPM | TEMPERATURE: 97.7 F | HEIGHT: 71 IN | SYSTOLIC BLOOD PRESSURE: 136 MMHG | OXYGEN SATURATION: 98 % | WEIGHT: 222.6 LBS | BODY MASS INDEX: 31.16 KG/M2 | DIASTOLIC BLOOD PRESSURE: 82 MMHG

## 2018-10-12 DIAGNOSIS — E66.9 OBESITY (BMI 30.0-34.9): ICD-10-CM

## 2018-10-12 DIAGNOSIS — D56.9 THALASSEMIA, UNSPECIFIED TYPE: ICD-10-CM

## 2018-10-12 DIAGNOSIS — Z11.59 NEED FOR HEPATITIS C SCREENING TEST: ICD-10-CM

## 2018-10-12 DIAGNOSIS — R79.89 HIGH SERUM HIGH DENSITY LIPOPROTEIN (HDL): ICD-10-CM

## 2018-10-12 DIAGNOSIS — I10 ESSENTIAL HYPERTENSION: Primary | ICD-10-CM

## 2018-10-12 DIAGNOSIS — D69.6 THROMBOCYTOPENIA (HCC): ICD-10-CM

## 2018-10-12 PROCEDURE — 99213 OFFICE O/P EST LOW 20 MIN: CPT | Performed by: PHYSICIAN ASSISTANT

## 2018-10-12 PROCEDURE — 1036F TOBACCO NON-USER: CPT | Performed by: PHYSICIAN ASSISTANT

## 2018-10-12 NOTE — ASSESSMENT & PLAN NOTE
BP stable at this time with home readings  Discussed importance of home BP checks  Discussed BP goal   Encouraged weight loss

## 2018-10-12 NOTE — ASSESSMENT & PLAN NOTE
Discussed lipid panel  Encouraged low fat low cholesterol diet and daily activity  Discussed addition of OTC phytosterol which can help to improve cholesterol  Encouraged weight loss

## 2018-10-12 NOTE — PROGRESS NOTES
[facility]  Standard Office Visit  Patient ID: Faby Lara    : 1960  Age/Gender: 56 y o  male     DATE: 10/12/2018      Assessment/Plan:    Need for hepatitis C screening test  Hep c screening negative 18    Thrombocytopenia (HCC)  Ptl improved on recent labs to 149  Will resolve    High serum high density lipoprotein (HDL)  Discussed lipid panel  Encouraged low fat low cholesterol diet and daily activity  Discussed addition of OTC phytosterol which can help to improve cholesterol  Encouraged weight loss  Obesity (BMI 30 0-34 9)  222 from 219 lbs  Encouraged weight loss to help with BP and cholesterol  Essential hypertension  BP stable at this time with home readings  Discussed importance of home BP checks  Discussed BP goal   Encouraged weight loss  Thalassemia  Repeat CBC reviewed  Ptl returned to normal   Will discuss CBC further with Dr Matilda Fierro as nucleated RBC newly seen on recent CBC  Labs ordered prior to follow up  Diagnoses and all orders for this visit:    Essential hypertension  -     Comprehensive metabolic panel; Future    Thrombocytopenia (HCC)    Need for hepatitis C screening test    High serum high density lipoprotein (HDL)    Obesity (BMI 30 0-34 9)  -     CBC and differential; Future  -     Comprehensive metabolic panel; Future    Thalassemia, unspecified type  -     CBC and differential; Future          Subjective:   Chief Complaint   Patient presents with    Hypertension     Pt is here today for HTN 6X month follow-up         Faby Lara is a 56 y o  male who presents to the office on 10/12/2018 for     Follow up for labs and BP  He notes he is checking his BP at home with improved control  Taking lisinopril as directed  With home readings:  Systolic low 894'K  Diastolic mid to lower 73'G     Has trip to [place] this summer  Feeling well at this time    No complaints or concerns  No HA or dizziness  He notes he will be starting back up with soccer to get back in shape  Has his daughters wedding coming up and he and his wife want to try to get in better health  Hypertension   This is a chronic problem  The current episode started more than 1 year ago  The problem is unchanged  The problem is controlled  Pertinent negatives include no blurred vision, chest pain, headaches, malaise/fatigue, palpitations, peripheral edema or shortness of breath  Risk factors for coronary artery disease include male gender and obesity  Past treatments include ACE inhibitors  The following portions of the patient's history were reviewed and updated as appropriate: allergies, current medications, past family history, past medical history, past social history, past surgical history and problem list     Review of Systems   Constitutional: Negative for fever, malaise/fatigue and unexpected weight change  Eyes: Negative for blurred vision and visual disturbance  Respiratory: Negative for cough, shortness of breath and wheezing  Cardiovascular: Negative for chest pain and palpitations  Gastrointestinal: Negative for abdominal pain, constipation, diarrhea, nausea and vomiting  Genitourinary: Negative for dysuria  Neurological: Negative for dizziness, syncope, weakness, light-headedness and headaches  Psychiatric/Behavioral: Negative for agitation  Patient Active Problem List   Diagnosis    Essential hypertension    High serum high density lipoprotein (HDL)    Hypopigmentation    Microcytosis    Seasonal allergies    Thalassemia    Obesity (BMI 30 0-34  9)       Past Medical History:   Diagnosis Date    Allergic     Allergic rhinitis     GERD (gastroesophageal reflux disease)     Hydrocephalus     Hypertension     Obesity (BMI 30 0-34  9) 4/11/2018    Thalassemia 10/25/2017       Past Surgical History:   Procedure Laterality Date    [place] COLONOSCOPY FLX DX W/COLLJ [facility] INPATIENT [facility]IT[facility] WHEN PFRMD N/A 2/8/2017    Procedure: COLONOSCOPY;  Surgeon: Nova Boyd MD;  Location: [facility]; Service: Gastroenterology    SHOULDER SURGERY      last assessed 3/8/2016    VENTRICULAR ATRIAL SHUNT      for hydrocephalus    VENTRICULOPERITONEAL SHUNT           Current Outpatient Prescriptions:     lisinopril (ZESTRIL) 10 mg tablet, Take 1 tablet (10 mg total) by mouth daily, Disp: 30 tablet, Rfl: 5    fluticasone (FLONASE) 50 mcg/act nasal spray, 1 spray into each nostril daily, Disp: , Rfl:     Allergies   Allergen Reactions    Pollen Extract        Social History     Social History    Marital status: /Civil Union     Spouse name: N/A    Number of children: N/A    Years of education: N/A     Social History Main Topics    Smoking status: Never Smoker    Smokeless tobacco: Never Used    Alcohol use Yes      Comment: occasionally    Drug use: No    Sexual activity: Not Asked     Other Topics Concern    None     Social History Narrative    Activities:  Soccer       Family History   Problem Relation Age of Onset    Diabetes Mother         DM    Diabetes Father         DM    Hypertension Father     Diabetes Brother         DM    Heart disease Brother         problem    Liver disease Family         sibling    Lung cancer Family         Uncle    Diabetes type II Brother     Cirrhosis Brother     Diabetes type II Brother     Dialysis Brother        PHQ-9 Depression Screening    PHQ-9:    Frequency of the following problems over the past two weeks:              Health Maintenance   Topic Date Due    Pneumococcal PPSV23 Highest Risk Adult (1 of 3 - PCV13) 11/22/1979    INFLUENZA VACCINE  07/01/2018    Depression Screening PHQ  08/29/2019    CRC Screening: Colonoscopy  02/08/2022    DTaP,Tdap,and Td Vaccines (2 - Td) 04/11/2028       There is no immunization history for the selected administration types on file for this patient       Objective:  Vitals:    10/12/18 1256 10/12/18 1315   BP: 140/84 136/82   BP Location: Left arm Left arm   Patient Position: Sitting Sitting   Cuff Size: Adult    Pulse: 65    Temp: 97 7 °F (36 5 °C)    TempSrc: Oral    SpO2: 98%    Weight: 101 kg (222 lb 9 6 oz)    Height: 5' 10 5" (1 791 m)      Wt Readings from Last 3 Encounters:   10/12/18 101 kg (222 lb 9 6 oz)   08/29/18 99 4 kg (219 lb 3 2 oz)   08/24/18 100 kg (221 lb)     Body mass index is 31 49 kg/m²  No exam data present       Physical Exam   Constitutional: He is oriented to person, place, and time  He appears well-developed and well-nourished  No distress  Alert, pleasant cooperative, seated in room in NAD   HENT:   Head: Normocephalic and atraumatic  Mouth/Throat: Oropharynx is clear and moist  No oropharyngeal exudate  Port right scalp, non-tender, normal overlying skin   Eyes: Pupils are equal, round, and reactive to light  Right eye exhibits no discharge  Left eye exhibits no discharge  No scleral icterus  Neck: Neck supple  Cardiovascular: Normal rate, regular rhythm and normal heart sounds  No murmur heard  Pulmonary/Chest: Effort normal and breath sounds normal  No respiratory distress  He has no wheezes  He has no rales  CTA throughout  NO crackles, rhonchi, wheeze  No resp distress   Abdominal: Soft  Bowel sounds are normal  There is no tenderness  There is no guarding  Soft, NT/ND   +BS   Musculoskeletal: He exhibits no edema  No LE edema  Neurological: He is alert and oriented to person, place, and time  No gross focal neuro deficits on exam   Skin: Skin is warm and dry  He is not diaphoretic  Hypopigmented skin on extremities   Psychiatric: He has a normal mood and affect  His behavior is normal  Thought content normal    Nursing note and vitals reviewed            Future Appointments  Date Time Provider Chaparrita Valdez   4/19/2019 1:15 PM Kylah Lockwood PA-C [address] [place] [address] [facility]    Patient Care Team:  Kerry Lynn PA-C as PCP - General (Internal Medicine)  Lynn Aguayo MD

## 2018-10-12 NOTE — TELEPHONE ENCOUNTER
Could you please take a look at CBC from 9/17/18 showing nucleated RBC  Patient with a history of thalassemia  Please advise if any further workup is needed for this finding on CBC    Thanks Rex Maurice

## 2018-10-12 NOTE — PATIENT INSTRUCTIONS
Need for hepatitis C screening test  Hep c screening negative 9/17/18    Thrombocytopenia (HCC)  Ptl improved on recent labs to 149  Will resolve    High serum high density lipoprotein (HDL)  Discussed lipid panel  Encouraged low fat low cholesterol dier and daily activity  Discussed addition of OTC phytosterol which can help to improve cholesterol  Encouraged weight loss  Obesity (BMI 30 0-34 9)  222 from 219 lbs  Encouraged weight loss to help with BP and cholesterol

## 2018-10-12 NOTE — ASSESSMENT & PLAN NOTE
Repeat CBC reviewed  Ptl returned to normal   Will discuss CBC further with Dr Alejandro Garcia as nucleated RBC newly seen on recent CBC  Labs ordered prior to follow up

## 2018-11-02 DIAGNOSIS — R71.8 MICROCYTOSIS: ICD-10-CM

## 2018-11-02 DIAGNOSIS — R79.89 ABNORMAL CBC: ICD-10-CM

## 2018-11-02 DIAGNOSIS — D56.9 THALASSEMIA, UNSPECIFIED TYPE: Primary | ICD-10-CM

## 2018-11-02 NOTE — TELEPHONE ENCOUNTER
Could you please contact patient  Needs repeat CBC in 8 weeks  To follow up his last CBC    Thanks Usama Shelton

## 2018-11-05 NOTE — TELEPHONE ENCOUNTER
Spoke to patient and informed him he needs repeat CBC on our about 11/12    Will mail requisition as per pt request

## 2019-01-07 DIAGNOSIS — I10 HYPERTENSION, UNSPECIFIED TYPE: ICD-10-CM

## 2019-01-07 RX ORDER — LISINOPRIL 10 MG/1
10 TABLET ORAL DAILY
Qty: 90 TABLET | Refills: 1 | Status: SHIPPED | OUTPATIENT
Start: 2019-01-07 | End: 2020-03-05 | Stop reason: SDUPTHER

## 2019-04-19 ENCOUNTER — OFFICE VISIT (OUTPATIENT)
Dept: INTERNAL MEDICINE CLINIC | Age: 59
End: 2019-04-19
Payer: COMMERCIAL

## 2019-04-19 VITALS
BODY MASS INDEX: 31.21 KG/M2 | OXYGEN SATURATION: 98 % | HEART RATE: 76 BPM | DIASTOLIC BLOOD PRESSURE: 86 MMHG | SYSTOLIC BLOOD PRESSURE: 132 MMHG | TEMPERATURE: 97.6 F | WEIGHT: 220.6 LBS

## 2019-04-19 DIAGNOSIS — M54.50 LOW BACK PAIN WITHOUT SCIATICA, UNSPECIFIED BACK PAIN LATERALITY, UNSPECIFIED CHRONICITY: ICD-10-CM

## 2019-04-19 DIAGNOSIS — D56.9 THALASSEMIA, UNSPECIFIED TYPE: ICD-10-CM

## 2019-04-19 DIAGNOSIS — I10 ESSENTIAL HYPERTENSION: ICD-10-CM

## 2019-04-19 DIAGNOSIS — Z23 NEED FOR VACCINATION FOR STREP PNEUMONIAE: Primary | ICD-10-CM

## 2019-04-19 DIAGNOSIS — Z13.220 LIPID SCREENING: ICD-10-CM

## 2019-04-19 DIAGNOSIS — M62.830 SPASM OF THORACIC BACK MUSCLE: ICD-10-CM

## 2019-04-19 PROBLEM — R71.8 MICROCYTOSIS: Status: RESOLVED | Noted: 2017-06-07 | Resolved: 2019-04-19

## 2019-04-19 PROCEDURE — 99214 OFFICE O/P EST MOD 30 MIN: CPT | Performed by: PHYSICIAN ASSISTANT

## 2019-04-19 RX ORDER — METHOCARBAMOL 500 MG/1
500 TABLET, FILM COATED ORAL DAILY PRN
Qty: 7 TABLET | Refills: 0 | Status: SHIPPED | OUTPATIENT
Start: 2019-04-19 | End: 2019-05-31 | Stop reason: ALTCHOICE

## 2019-05-01 ENCOUNTER — APPOINTMENT (OUTPATIENT)
Dept: LAB | Age: 59
End: 2019-05-01
Payer: COMMERCIAL

## 2019-05-01 ENCOUNTER — APPOINTMENT (OUTPATIENT)
Dept: RADIOLOGY | Age: 59
End: 2019-05-01
Payer: COMMERCIAL

## 2019-05-01 DIAGNOSIS — Z13.220 LIPID SCREENING: ICD-10-CM

## 2019-05-01 DIAGNOSIS — I10 ESSENTIAL HYPERTENSION: ICD-10-CM

## 2019-05-01 DIAGNOSIS — M54.50 LOW BACK PAIN WITHOUT SCIATICA, UNSPECIFIED BACK PAIN LATERALITY, UNSPECIFIED CHRONICITY: ICD-10-CM

## 2019-05-01 DIAGNOSIS — D56.9 THALASSEMIA, UNSPECIFIED TYPE: ICD-10-CM

## 2019-05-01 DIAGNOSIS — M62.830 SPASM OF THORACIC BACK MUSCLE: ICD-10-CM

## 2019-05-01 LAB
ALBUMIN SERPL BCP-MCNC: 4.1 G/DL (ref 3.5–5)
ALP SERPL-CCNC: 47 U/L (ref 46–116)
ALT SERPL W P-5'-P-CCNC: 50 U/L (ref 12–78)
ANION GAP SERPL CALCULATED.3IONS-SCNC: 4 MMOL/L (ref 4–13)
AST SERPL W P-5'-P-CCNC: 26 U/L (ref 5–45)
BASOPHILS # BLD AUTO: 0.02 THOUSANDS/ΜL (ref 0–0.1)
BASOPHILS NFR BLD AUTO: 1 % (ref 0–1)
BILIRUB SERPL-MCNC: 0.74 MG/DL (ref 0.2–1)
BUN SERPL-MCNC: 20 MG/DL (ref 5–25)
CALCIUM SERPL-MCNC: 9.1 MG/DL (ref 8.3–10.1)
CHLORIDE SERPL-SCNC: 102 MMOL/L (ref 100–108)
CHOLEST SERPL-MCNC: 238 MG/DL (ref 50–200)
CO2 SERPL-SCNC: 29 MMOL/L (ref 21–32)
CREAT SERPL-MCNC: 1.06 MG/DL (ref 0.6–1.3)
EOSINOPHIL # BLD AUTO: 0.13 THOUSAND/ΜL (ref 0–0.61)
EOSINOPHIL NFR BLD AUTO: 3 % (ref 0–6)
ERYTHROCYTE [DISTWIDTH] IN BLOOD BY AUTOMATED COUNT: 18.2 % (ref 11.6–15.1)
GFR SERPL CREATININE-BSD FRML MDRD: 78 ML/MIN/1.73SQ M
GLUCOSE P FAST SERPL-MCNC: 93 MG/DL (ref 65–99)
HCT VFR BLD AUTO: 47.1 % (ref 36.5–49.3)
HDLC SERPL-MCNC: 78 MG/DL (ref 40–60)
HGB BLD-MCNC: 14.5 G/DL (ref 12–17)
IMM GRANULOCYTES # BLD AUTO: 0.02 THOUSAND/UL (ref 0–0.2)
IMM GRANULOCYTES NFR BLD AUTO: 1 % (ref 0–2)
LDLC SERPL CALC-MCNC: 141 MG/DL (ref 0–100)
LYMPHOCYTES # BLD AUTO: 1.65 THOUSANDS/ΜL (ref 0.6–4.47)
LYMPHOCYTES NFR BLD AUTO: 38 % (ref 14–44)
MCH RBC QN AUTO: 21.1 PG (ref 26.8–34.3)
MCHC RBC AUTO-ENTMCNC: 30.8 G/DL (ref 31.4–37.4)
MCV RBC AUTO: 69 FL (ref 82–98)
MONOCYTES # BLD AUTO: 0.54 THOUSAND/ΜL (ref 0.17–1.22)
MONOCYTES NFR BLD AUTO: 13 % (ref 4–12)
NEUTROPHILS # BLD AUTO: 1.96 THOUSANDS/ΜL (ref 1.85–7.62)
NEUTS SEG NFR BLD AUTO: 44 % (ref 43–75)
NRBC BLD AUTO-RTO: 0 /100 WBCS
PLATELET # BLD AUTO: 163 THOUSANDS/UL (ref 149–390)
POTASSIUM SERPL-SCNC: 4.4 MMOL/L (ref 3.5–5.3)
PROT SERPL-MCNC: 7.5 G/DL (ref 6.4–8.2)
RBC # BLD AUTO: 6.86 MILLION/UL (ref 3.88–5.62)
SODIUM SERPL-SCNC: 135 MMOL/L (ref 136–145)
TRIGL SERPL-MCNC: 96 MG/DL
WBC # BLD AUTO: 4.32 THOUSAND/UL (ref 4.31–10.16)

## 2019-05-01 PROCEDURE — 80053 COMPREHEN METABOLIC PANEL: CPT

## 2019-05-01 PROCEDURE — 36415 COLL VENOUS BLD VENIPUNCTURE: CPT

## 2019-05-01 PROCEDURE — 72100 X-RAY EXAM L-S SPINE 2/3 VWS: CPT

## 2019-05-01 PROCEDURE — 72072 X-RAY EXAM THORAC SPINE 3VWS: CPT

## 2019-05-01 PROCEDURE — 85025 COMPLETE CBC W/AUTO DIFF WBC: CPT

## 2019-05-01 PROCEDURE — 80061 LIPID PANEL: CPT

## 2019-05-31 ENCOUNTER — OFFICE VISIT (OUTPATIENT)
Dept: INTERNAL MEDICINE CLINIC | Facility: CLINIC | Age: 59
End: 2019-05-31
Payer: COMMERCIAL

## 2019-05-31 VITALS
TEMPERATURE: 98.2 F | WEIGHT: 220.2 LBS | HEART RATE: 69 BPM | OXYGEN SATURATION: 100 % | HEIGHT: 72 IN | SYSTOLIC BLOOD PRESSURE: 136 MMHG | BODY MASS INDEX: 29.82 KG/M2 | DIASTOLIC BLOOD PRESSURE: 88 MMHG

## 2019-05-31 DIAGNOSIS — I10 ESSENTIAL HYPERTENSION: ICD-10-CM

## 2019-05-31 DIAGNOSIS — M62.830 SPASM OF THORACIC BACK MUSCLE: ICD-10-CM

## 2019-05-31 DIAGNOSIS — E78.00 LOW DENSITY LIPOPROTEIN (LDL) CHOLESTEROL GREATER THAN 129 MG/DL: ICD-10-CM

## 2019-05-31 DIAGNOSIS — R79.89 HIGH SERUM HIGH DENSITY LIPOPROTEIN (HDL): ICD-10-CM

## 2019-05-31 DIAGNOSIS — D56.9 THALASSEMIA, UNSPECIFIED TYPE: Primary | ICD-10-CM

## 2019-05-31 DIAGNOSIS — Z23 NEED FOR VACCINATION FOR STREP PNEUMONIAE: ICD-10-CM

## 2019-05-31 DIAGNOSIS — J30.2 SEASONAL ALLERGIES: ICD-10-CM

## 2019-05-31 PROBLEM — M54.50 LOW BACK PAIN: Status: RESOLVED | Noted: 2019-04-19 | Resolved: 2019-05-31

## 2019-05-31 PROCEDURE — 3008F BODY MASS INDEX DOCD: CPT | Performed by: PHYSICIAN ASSISTANT

## 2019-05-31 PROCEDURE — 3075F SYST BP GE 130 - 139MM HG: CPT | Performed by: PHYSICIAN ASSISTANT

## 2019-05-31 PROCEDURE — 1036F TOBACCO NON-USER: CPT | Performed by: PHYSICIAN ASSISTANT

## 2019-05-31 PROCEDURE — 3079F DIAST BP 80-89 MM HG: CPT | Performed by: PHYSICIAN ASSISTANT

## 2019-05-31 PROCEDURE — 99214 OFFICE O/P EST MOD 30 MIN: CPT | Performed by: PHYSICIAN ASSISTANT

## 2019-05-31 RX ORDER — FLUTICASONE PROPIONATE 50 MCG
1 SPRAY, SUSPENSION (ML) NASAL DAILY
Qty: 1 BOTTLE | Refills: 3 | Status: SHIPPED | OUTPATIENT
Start: 2019-05-31 | End: 2022-03-18 | Stop reason: ALTCHOICE

## 2019-11-22 ENCOUNTER — OFFICE VISIT (OUTPATIENT)
Dept: INTERNAL MEDICINE CLINIC | Facility: CLINIC | Age: 59
End: 2019-11-22
Payer: COMMERCIAL

## 2019-11-22 VITALS
BODY MASS INDEX: 30.94 KG/M2 | SYSTOLIC BLOOD PRESSURE: 130 MMHG | HEIGHT: 72 IN | OXYGEN SATURATION: 98 % | WEIGHT: 228.4 LBS | HEART RATE: 83 BPM | DIASTOLIC BLOOD PRESSURE: 76 MMHG | TEMPERATURE: 97.6 F

## 2019-11-22 DIAGNOSIS — I10 ESSENTIAL HYPERTENSION: ICD-10-CM

## 2019-11-22 DIAGNOSIS — E66.9 OBESITY (BMI 30.0-34.9): ICD-10-CM

## 2019-11-22 DIAGNOSIS — E78.00 LOW DENSITY LIPOPROTEIN (LDL) CHOLESTEROL GREATER THAN 129 MG/DL: Primary | ICD-10-CM

## 2019-11-22 DIAGNOSIS — Z12.5 SCREENING FOR MALIGNANT NEOPLASM OF PROSTATE: ICD-10-CM

## 2019-11-22 DIAGNOSIS — Z71.85 VACCINE COUNSELING: ICD-10-CM

## 2019-11-22 PROCEDURE — 1036F TOBACCO NON-USER: CPT | Performed by: PHYSICIAN ASSISTANT

## 2019-11-22 PROCEDURE — 99214 OFFICE O/P EST MOD 30 MIN: CPT | Performed by: PHYSICIAN ASSISTANT

## 2019-11-22 NOTE — ASSESSMENT & PLAN NOTE
Patient previously lost significant amount of weight in preparation for his daughter's wedding  He has gained some of his weight back  He notes that he has not been doing as well with his diet and exercise as of late but plans to get back on track  Give encouragement  Discussed weight loss goals  Encourage diet lifestyle modifications

## 2019-11-22 NOTE — ASSESSMENT & PLAN NOTE
ASCVD score calculated from recent labs 7%  Discussed low fat low cholesterol diet, daily activity  Patient verbalized understanding  Will check Lipid with next lab set

## 2019-11-22 NOTE — PATIENT INSTRUCTIONS
Low density lipoprotein (LDL) cholesterol greater than 129 mg/dL    ASCVD score calculated from recent labs 7%  Discussed low fat low cholesterol diet, daily activity  Patient verbalized understanding  Will check Lipid with next lab set  Essential hypertension  Continue lisinopril  Blood pressure well controlled    Continue to monitor blood pressure at home

## 2019-11-22 NOTE — PROGRESS NOTES
[facility] [address] [place] [address]  Standard Office Visit  Patient ID: Carlos Wiggins    : 1960  Age/Gender: 64 y o  male     DATE: 2019      Assessment/Plan:    Low density lipoprotein (LDL) cholesterol greater than 129 mg/dL    ASCVD score calculated from recent labs 7%  Discussed low fat low cholesterol diet, daily activity  Patient verbalized understanding  Will check Lipid with next lab set  Essential hypertension  Continue lisinopril  Blood pressure well controlled  Continue to monitor blood pressure at home    Obesity (BMI 30 0-34  9)  Patient previously lost significant amount of weight in preparation for his daughter's wedding  He has gained some of his weight back  He notes that he has not been doing as well with his diet and exercise as of late but plans to get back on track  Give encouragement  Discussed weight loss goals  Encourage diet lifestyle modifications  Diagnoses and all orders for this visit:    Low density lipoprotein (LDL) cholesterol greater than 129 mg/dL  -     Lipid Panel with Direct LDL reflex; Future  -     Comprehensive metabolic panel; Future  -     CBC and differential; Future    Essential hypertension  -     Lipid Panel with Direct LDL reflex; Future  -     Comprehensive metabolic panel; Future  -     CBC and differential; Future    Obesity (BMI 30 0-34 9)  -     Lipid Panel with Direct LDL reflex; Future  -     CBC and differential; Future    Screening for malignant neoplasm of prostate  -     PSA, Total Screen; Future    Vaccine counseling  Comments:  Recomended flu and pneumonia vaccine  discussed risks/bennefits  Patient verbalized understanding but is not interested  Subjective:   Chief Complaint   Patient presents with    Follow-up     Chronic Conditions-no refills needed, pt declines flu shot, no new issues or concerns to discuss today at visit    Labs done 19         Carlos Feliciano is a 64 y o  male who presents to the office on 11/22/2019 for     For follow up and to review labs  NO complaints or concerns  Taking all medications as directed  He tried to follow low salt diet  Continues to be active at work  Plays adult soccer  Notes he lost weight for his daughters wedding and has gained some of it back  Eats more chicken and fish  Does not eat red meat  Does not get vaccines  He had colonoscopy 2 years ago and was given 5 years per patient  BP has been controlled at home, about the same  Notes his daughter was admitted to the hospital and was dx with ITP  She is now doing well  Currently on low dose steroids  She is doing much better  Hypertension   This is a chronic problem  The current episode started more than 1 year ago  The problem is unchanged  The problem is controlled  Pertinent negatives include no anxiety, chest pain, headaches, malaise/fatigue, palpitations, peripheral edema or shortness of breath  There are no known risk factors for coronary artery disease  Past treatments include ACE inhibitors  Hyperlipidemia   This is a chronic problem  The problem is controlled  Exacerbating diseases include obesity  Pertinent negatives include no chest pain or shortness of breath  Compliance problems include adherence to diet  The following portions of the patient's history were reviewed and updated as appropriate: allergies, current medications, past family history, past medical history, past social history, past surgical history and problem list     Review of Systems   Constitutional: Negative for chills, fever, malaise/fatigue and unexpected weight change  Eyes: Negative for visual disturbance  Respiratory: Negative for cough, shortness of breath and wheezing  Cardiovascular: Negative for chest pain and palpitations  Gastrointestinal: Negative for abdominal pain, diarrhea, nausea and vomiting  Genitourinary: Negative for dysuria          No nocturia   Musculoskeletal: Negative for arthralgias  Neurological: Negative for dizziness, tremors, seizures, weakness, light-headedness, numbness and headaches  Patient Active Problem List   Diagnosis    Essential hypertension    High serum high density lipoprotein (HDL)    Hypopigmentation    Seasonal allergies    Thalassemia    Obesity (BMI 30 0-34  9)    Need for vaccination for Strep pneumoniae    Spasm of thoracic back muscle    Low density lipoprotein (LDL) cholesterol greater than 129 mg/dL       Past Medical History:   Diagnosis Date    Allergic     Allergic rhinitis     GERD (gastroesophageal reflux disease)     Hydrocephalus (HCC)     Hypertension     Obesity (BMI 30 0-34  9) 4/11/2018    Thalassemia 10/25/2017       Past Surgical History:   Procedure Laterality Date    ND COLONOSCOPY FLX DX W/COLLJ SPEC WHEN PFRMD N/A 2/8/2017    Procedure: COLONOSCOPY;  Surgeon: Timmy Ayon MD;  Location: [facility];   Service: Gastroenterology    SHOULDER SURGERY      last assessed 3/8/2016    VENTRICULAR ATRIAL SHUNT      for hydrocephalus    VENTRICULOPERITONEAL SHUNT           Current Outpatient Medications:     fluticasone (FLONASE) 50 mcg/act nasal spray, 1 spray into each nostril daily (Patient taking differently: 1 spray into each nostril daily as needed ), Disp: 1 Bottle, Rfl: 3    lisinopril (ZESTRIL) 10 mg tablet, Take 1 tablet (10 mg total) by mouth daily, Disp: 90 tablet, Rfl: 1    Allergies   Allergen Reactions    Pollen Extract        Social History     Socioeconomic History    Marital status: /Civil Union     Spouse name: None    Number of children: None    Years of education: None    Highest education level: None   Occupational History    None   Social Needs    Financial resource strain: None    Food insecurity:     Worry: None     Inability: None    Transportation needs:     Medical: None     Non-medical: None   Tobacco Use    Smoking status: Never Smoker    Smokeless tobacco: Never Used   Substance and Sexual Activity    Alcohol use: Yes     Comment: occasionally    Drug use: No    Sexual activity: Yes   Lifestyle    Physical activity:     Days per week: None     Minutes per session: None    Stress: None   Relationships    Social connections:     Talks on phone: None     Gets together: None     Attends [employer] service: None     Active member of club or organization: None     Attends meetings of clubs or organizations: None     Relationship status: None    Intimate partner violence:     Fear of current or ex partner: None     Emotionally abused: None     Physically abused: None     Forced sexual activity: None   Other Topics Concern    None   Social History Narrative    Activities:  Soccer       Family History   Problem Relation Age of Onset    Diabetes Mother         DM    Diabetes Father         DM    Hypertension Father     Diabetes Brother         DM    Heart disease Brother         problem    Liver disease Family         sibling    Lung cancer Family         Uncle    Diabetes type II Brother     Cirrhosis Brother     Diabetes type II Brother     Dialysis Brother     Thrombocytopenia Daughter        PHQ-9 Depression Screening    PHQ-9:    Frequency of the following problems over the past two weeks:       Little interest or pleasure in doing things:  0 - not at all  Feeling down, depressed, or hopeless:  0 - not at all  PHQ-2 Score:  0         Health Maintenance   Topic Date Due    HIV Screening  11/22/1977    Influenza Vaccine  04/19/2020 (Originally 7/1/2019)    Pneumococcal Vaccine: Pediatrics (0 to 5 Years) and At-Risk Patients (6 to 59 Years) (1 of 3 - PCV13) 05/31/2020 (Originally [birth year]/22/1966)    BMI: Followup Plan  05/31/2020    Depression Screening PHQ  11/22/2020    BMI: Adult  11/22/2020    CRC Screening: Colonoscopy  02/08/2022    Pneumococcal Vaccine: 65+ Years (1 of 2 - PCV13) 11/22/2027    DTaP,Tdap,and Td Vaccines (2 - Td) 04/11/2028    Hepatitis C Screening  Completed    HIB Vaccine  Aged Out    Hepatitis B Vaccine  Aged Out    IPV Vaccine  Aged Out    Hepatitis A Vaccine  Aged Out    Meningococcal ACWY Vaccine  Aged Out    HPV Vaccine  Aged Out       There is no immunization history for the selected administration types on file for this patient  Objective:  Vitals:    11/22/19 1314   BP: 130/76   BP Location: Left arm   Patient Position: Sitting   Cuff Size: Standard   Pulse: 83   Temp: 97 6 °F (36 4 °C)   TempSrc: Oral   SpO2: 98%   Weight: 104 kg (228 lb 6 4 oz)   Height: 5' 11 5" (1 816 m)     Wt Readings from Last 3 Encounters:   11/22/19 104 kg (228 lb 6 4 oz)   05/31/19 99 9 kg (220 lb 3 2 oz)   04/19/19 100 kg (220 lb 9 6 oz)     Body mass index is 31 41 kg/m²  No exam data present       Physical Exam   Constitutional: He appears well-developed and well-nourished  No distress  Alert pleasant cooperative  Seated in room in no acute distress   HENT:   Head: Normocephalic and atraumatic  Right Ear: External ear normal    Left Ear: External ear normal    Mouth/Throat: Oropharynx is clear and moist  No oropharyngeal exudate  Moist mucous membranes  Normal posterior pharynx     shunt in place right scalp  Normal overlying skin  Nontender  Eyes: Pupils are equal, round, and reactive to light  Right eye exhibits no discharge  Left eye exhibits no discharge  No scleral icterus  Neck: Neck supple  Cardiovascular: Normal rate, regular rhythm and normal heart sounds  No murmur heard  Pulmonary/Chest: Effort normal and breath sounds normal  No respiratory distress  He has no wheezes  He has no rales  Abdominal: Soft  Bowel sounds are normal  He exhibits no distension  There is no tenderness  There is no guarding  Musculoskeletal: He exhibits no edema  Lymphadenopathy:     He has no cervical adenopathy  Neurological: He is alert     No gross focal neurologic deficits on exam   Skin: Skin is warm and dry  He is not diaphoretic  Hypopigmented patches throughout extremities   Psychiatric: He has a normal mood and affect  His behavior is normal  Thought content normal    Nursing note and vitals reviewed            Future Appointments   Date Time Provider Chaparrita Courtney   5/22/2020  1:30 PM Maryse Lafleur PA-C [address] [place] [address]    Patient Care Team:  Maryse Lafleur PA-C as PCP - General (Internal Medicine)  MD Lisa Noonan MD as Endoscopist

## 2019-12-18 DIAGNOSIS — I10 HYPERTENSION, UNSPECIFIED TYPE: ICD-10-CM

## 2019-12-23 RX ORDER — LISINOPRIL 10 MG/1
TABLET ORAL
Qty: 30 TABLET | Refills: 5 | OUTPATIENT
Start: 2019-12-23

## 2020-03-05 DIAGNOSIS — I10 HYPERTENSION, UNSPECIFIED TYPE: ICD-10-CM

## 2020-03-05 RX ORDER — LISINOPRIL 10 MG/1
10 TABLET ORAL DAILY
Qty: 90 TABLET | Refills: 1 | Status: SHIPPED | OUTPATIENT
Start: 2020-03-05 | End: 2020-10-01 | Stop reason: SDUPTHER

## 2020-05-29 ENCOUNTER — TELEMEDICINE (OUTPATIENT)
Dept: INTERNAL MEDICINE CLINIC | Facility: CLINIC | Age: 60
End: 2020-05-29
Payer: COMMERCIAL

## 2020-05-29 VITALS
SYSTOLIC BLOOD PRESSURE: 117 MMHG | DIASTOLIC BLOOD PRESSURE: 83 MMHG | TEMPERATURE: 98.6 F | HEIGHT: 72 IN | HEART RATE: 69 BPM | WEIGHT: 213 LBS | BODY MASS INDEX: 28.85 KG/M2

## 2020-05-29 DIAGNOSIS — I10 ESSENTIAL HYPERTENSION: Primary | ICD-10-CM

## 2020-05-29 DIAGNOSIS — D56.9 THALASSEMIA, UNSPECIFIED TYPE: ICD-10-CM

## 2020-05-29 DIAGNOSIS — E78.00 LOW DENSITY LIPOPROTEIN (LDL) CHOLESTEROL GREATER THAN 129 MG/DL: ICD-10-CM

## 2020-05-29 PROCEDURE — 99213 OFFICE O/P EST LOW 20 MIN: CPT | Performed by: PHYSICIAN ASSISTANT

## 2020-05-29 PROCEDURE — 3079F DIAST BP 80-89 MM HG: CPT | Performed by: PHYSICIAN ASSISTANT

## 2020-05-29 PROCEDURE — 3008F BODY MASS INDEX DOCD: CPT | Performed by: PHYSICIAN ASSISTANT

## 2020-05-29 PROCEDURE — 3074F SYST BP LT 130 MM HG: CPT | Performed by: PHYSICIAN ASSISTANT

## 2020-05-29 RX ORDER — CHLORAL HYDRATE 500 MG
1000 CAPSULE ORAL DAILY
Qty: 60 CAPSULE | Refills: 1 | Status: SHIPPED | OUTPATIENT
Start: 2020-05-29 | End: 2020-09-25

## 2020-09-22 DIAGNOSIS — E78.00 LOW DENSITY LIPOPROTEIN (LDL) CHOLESTEROL GREATER THAN 129 MG/DL: ICD-10-CM

## 2020-09-25 RX ORDER — CHLORAL HYDRATE 500 MG
CAPSULE ORAL
Qty: 60 CAPSULE | Refills: 1 | Status: SHIPPED | OUTPATIENT
Start: 2020-09-25 | End: 2021-01-22

## 2020-10-01 DIAGNOSIS — I10 HYPERTENSION, UNSPECIFIED TYPE: ICD-10-CM

## 2020-10-02 RX ORDER — LISINOPRIL 10 MG/1
10 TABLET ORAL DAILY
Qty: 90 TABLET | Refills: 1 | Status: SHIPPED | OUTPATIENT
Start: 2020-10-02 | End: 2020-10-16 | Stop reason: SDUPTHER

## 2020-10-16 ENCOUNTER — OFFICE VISIT (OUTPATIENT)
Dept: INTERNAL MEDICINE CLINIC | Facility: CLINIC | Age: 60
End: 2020-10-16
Payer: COMMERCIAL

## 2020-10-16 VITALS
OXYGEN SATURATION: 98 % | TEMPERATURE: 97.9 F | HEART RATE: 67 BPM | HEIGHT: 72 IN | SYSTOLIC BLOOD PRESSURE: 140 MMHG | BODY MASS INDEX: 30.28 KG/M2 | RESPIRATION RATE: 18 BRPM | WEIGHT: 223.6 LBS | DIASTOLIC BLOOD PRESSURE: 90 MMHG

## 2020-10-16 DIAGNOSIS — Z71.85 VACCINE COUNSELING: ICD-10-CM

## 2020-10-16 DIAGNOSIS — D56.9 THALASSEMIA, UNSPECIFIED TYPE: ICD-10-CM

## 2020-10-16 DIAGNOSIS — I10 HYPERTENSION, UNSPECIFIED TYPE: ICD-10-CM

## 2020-10-16 DIAGNOSIS — I10 ESSENTIAL HYPERTENSION: Primary | ICD-10-CM

## 2020-10-16 DIAGNOSIS — R79.89 HIGH SERUM HIGH DENSITY LIPOPROTEIN (HDL): ICD-10-CM

## 2020-10-16 DIAGNOSIS — E78.00 LOW DENSITY LIPOPROTEIN (LDL) CHOLESTEROL GREATER THAN 129 MG/DL: ICD-10-CM

## 2020-10-16 DIAGNOSIS — E66.9 OBESITY (BMI 30.0-34.9): ICD-10-CM

## 2020-10-16 PROCEDURE — 3077F SYST BP >= 140 MM HG: CPT | Performed by: PHYSICIAN ASSISTANT

## 2020-10-16 PROCEDURE — 99214 OFFICE O/P EST MOD 30 MIN: CPT | Performed by: PHYSICIAN ASSISTANT

## 2020-10-16 PROCEDURE — 1036F TOBACCO NON-USER: CPT | Performed by: PHYSICIAN ASSISTANT

## 2020-10-16 PROCEDURE — 3725F SCREEN DEPRESSION PERFORMED: CPT | Performed by: PHYSICIAN ASSISTANT

## 2020-10-16 PROCEDURE — 3080F DIAST BP >= 90 MM HG: CPT | Performed by: PHYSICIAN ASSISTANT

## 2020-10-16 RX ORDER — LISINOPRIL 20 MG/1
20 TABLET ORAL DAILY
Qty: 30 TABLET | Refills: 4
Start: 2020-10-16 | End: 2021-02-12 | Stop reason: ALTCHOICE

## 2021-01-22 DIAGNOSIS — E78.00 LOW DENSITY LIPOPROTEIN (LDL) CHOLESTEROL GREATER THAN 129 MG/DL: ICD-10-CM

## 2021-01-22 RX ORDER — CHLORAL HYDRATE 500 MG
CAPSULE ORAL
Qty: 60 CAPSULE | Refills: 1 | Status: SHIPPED | OUTPATIENT
Start: 2021-01-22 | End: 2021-04-23

## 2021-02-12 ENCOUNTER — OFFICE VISIT (OUTPATIENT)
Dept: INTERNAL MEDICINE CLINIC | Facility: CLINIC | Age: 61
End: 2021-02-12
Payer: COMMERCIAL

## 2021-02-12 VITALS
RESPIRATION RATE: 18 BRPM | HEIGHT: 72 IN | DIASTOLIC BLOOD PRESSURE: 84 MMHG | TEMPERATURE: 97.7 F | HEART RATE: 71 BPM | OXYGEN SATURATION: 100 % | WEIGHT: 225.4 LBS | SYSTOLIC BLOOD PRESSURE: 134 MMHG | BODY MASS INDEX: 30.53 KG/M2

## 2021-02-12 DIAGNOSIS — D56.9 THALASSEMIA, UNSPECIFIED TYPE: Primary | ICD-10-CM

## 2021-02-12 DIAGNOSIS — I10 ESSENTIAL HYPERTENSION: ICD-10-CM

## 2021-02-12 DIAGNOSIS — E78.00 LOW DENSITY LIPOPROTEIN (LDL) CHOLESTEROL GREATER THAN 129 MG/DL: ICD-10-CM

## 2021-02-12 DIAGNOSIS — R53.83 OTHER FATIGUE: ICD-10-CM

## 2021-02-12 PROCEDURE — 99214 OFFICE O/P EST MOD 30 MIN: CPT | Performed by: PHYSICIAN ASSISTANT

## 2021-02-12 PROCEDURE — 3725F SCREEN DEPRESSION PERFORMED: CPT | Performed by: PHYSICIAN ASSISTANT

## 2021-02-12 RX ORDER — LISINOPRIL 10 MG/1
10 TABLET ORAL DAILY
COMMUNITY
Start: 2021-01-23 | End: 2021-03-19 | Stop reason: SDUPTHER

## 2021-02-12 RX ORDER — FEXOFENADINE HCL 180 MG/1
180 TABLET ORAL DAILY
COMMUNITY
End: 2022-03-18 | Stop reason: ALTCHOICE

## 2021-02-12 NOTE — ASSESSMENT & PLAN NOTE
At last visit it was discussed increased blood pressure from 10 to 20 mg however patient has not yet made this change  I discussed the importance of blood pressure control and will gradually taper up his lisinopril dose  Advised him to increase lisinopril to 15 mg daily (1 and 1/2 tablets daily)  Monitor BP at home  BP goal <120/<80  Discussed if blood pressure is not at goal will then increase to 20 mg daily  Encouraged low-salt diet and daily exercise

## 2021-02-12 NOTE — PROGRESS NOTES
[facility] [address] [place] [address]  Standard Office Visit  Patient ID: Walter Johnson    : 1960  Age/Gender: 64 y o  male     DATE: 2021      Assessment/Plan:    Other fatigue  For history symptoms appear to be related to decreased sleep patient has throughout his work week  He works night shift and on the weekends tries to return to a day shift schedule  Discussed importance of sleep hygiene and getting plenty of sleep  Avoidance of caffeine  Goal of 7 hours of sleep per night to allow for restorative sleep  Will obtain labs to rule out any other secondary causes of fatigue  Essential hypertension  At last visit it was discussed increased blood pressure from 10 to 20 mg however patient has not yet made this change  I discussed the importance of blood pressure control and will gradually taper up his lisinopril dose  Advised him to increase lisinopril to 15 mg daily (1 and 1/2 tablets daily)  Monitor BP at home  BP goal <120/<80  Discussed if blood pressure is not at goal will then increase to 20 mg daily  Encouraged low-salt diet and daily exercise  Low density lipoprotein (LDL) cholesterol greater than 129 mg/dL  Will check lipid panel with next lab set  Give encouragement with low-fat low-cholesterol diet    Thalassemia  Will check B12 folate and iron panel due to fatigue and patient's underlying thalassemia       Diagnoses and all orders for this visit:    Thalassemia, unspecified type  -     Comprehensive metabolic panel; Future  -     CBC and differential; Future  -     TSH, 3rd generation with Free T4 reflex; Future  -     Iron Panel (Includes Ferritin, Iron Sat%, Iron, and TIBC); Future  -     Vitamin B12; Future  -     Folate; Future    Essential hypertension  -     Comprehensive metabolic panel; Future    Low density lipoprotein (LDL) cholesterol greater than 129 mg/dL  -     Lipid panel;  Future    Other fatigue  -     Comprehensive metabolic panel; Future  -     CBC and differential; Future  -     TSH, 3rd generation with Free T4 reflex; Future  -     Lipid panel; Future  -     Iron Panel (Includes Ferritin, Iron Sat%, Iron, and TIBC); Future  -     Vitamin B12; Future  -     Folate; Future    Other orders  -     lisinopril (ZESTRIL) 10 mg tablet; Take 10 mg by mouth daily  -     fexofenadine ([facility] Allergy Relief) 180 MG tablet; Take 180 mg by mouth daily [facility] brand          BMI Counseling: Body mass index is 31 kg/m²  The BMI is above normal  Nutrition recommendations include decreasing portion sizes, encouraging healthy choices of fruits and vegetables, consuming healthier snacks, limiting drinks that contain sugar and increasing intake of lean protein  Exercise recommendations include exercising 3-5 times per week  No pharmacotherapy was ordered  Patient referred to PCP due to patient being overweight  Depression Screening and Follow-up Plan: Patient's depression screening was positive with a PHQ-2 score of 0  Clincally patient does not have depression  No treatment is required  Subjective:   Chief Complaint   Patient presents with    Follow-up     4 month, no bw this time, no issues or concerns    Flu Vaccine     refused    health maintenance     annual exam due, BMI f/u plan due         Ginny Chong is a 64 y o  male who presents to the office on 2/12/2021 for     Patient here for follow-up chronic conditions  He has been taking lisinopril 10 mg daily since last visit  He has not been checking his blood pressure at home but when he does get it checked he feels his blood pressure has been pretty well controlled  He notes that he is somewhat tired of this pandemic as many of the things he used to enjoy in his life he can no longer do  He feels that he just goes to work and comes home and goes to sleep and repeats  Previously he enjoyed playing soccer and going out test exercises for walks with his wife    This has not been something he can do anymore with all this know we have been having  Indoor soccer has been closed due to the pandemic and many of the activities he used to use to fill his life are currently on hold  He has been continuing to work throughout the pandemic and works night shift  On the weekends he goes back to regular family life and tries to maintain a day shift schedule on the weekends  Throughout the work week he goes to bed at midnight and works throughout the early morning  He estimates throughout his work week he gets approximately 4-5 hours per night  Throughout the work week he feels tired and fatigued however on the weekends when he is able to get a full 8 hours of sleep he feels energetic and well rested  He wants to know if he can take something over-the-counter to help with his fatigue  He has not tried to increase his sleep on his work nights but feels he could do so if needed  He denies any weakness  He denies any exertional symptoms  No chest pain or palpitations with activity  No lightheadedness or dizziness with activity  He has a very physically demanding job and he is able to perform his job without any limitations  Colonoscopy current  No recent lab studies  Family has been doing well  Wife lives at home with him and her health is doing well  Has a daughter who recently was  and has a child on the way which she is excited about  He is not interested in any vaccinations at this time  Hypertension  This is a chronic problem  The problem is unchanged  The problem is controlled  Associated symptoms include malaise/fatigue  Pertinent negatives include no anxiety, chest pain, headaches, palpitations, peripheral edema or shortness of breath  Risk factors for coronary artery disease include male gender and obesity  The current treatment provides mild improvement  Fatigue  This is a new problem  The current episode started more than 1 month ago   The problem occurs intermittently  The problem has been waxing and waning  Associated symptoms include fatigue  Pertinent negatives include no abdominal pain, change in bowel habit, chest pain, chills, congestion, coughing, fever, headaches, myalgias, nausea, vomiting or weakness  The following portions of the patient's history were reviewed and updated as appropriate: allergies, current medications, past family history, past medical history, past social history, past surgical history and problem list     Review of Systems   Constitutional: Positive for fatigue and malaise/fatigue  Negative for chills, fever and unexpected weight change  HENT: Negative for congestion  Respiratory: Negative for cough, shortness of breath and wheezing  Cardiovascular: Negative for chest pain and palpitations  Gastrointestinal: Negative for abdominal pain, change in bowel habit, constipation, diarrhea, nausea and vomiting  Musculoskeletal: Negative for myalgias  Neurological: Negative for dizziness, weakness, light-headedness and headaches  Patient Active Problem List   Diagnosis    Essential hypertension    High serum high density lipoprotein (HDL)    Hypopigmentation    Seasonal allergies    Thalassemia    Obesity (BMI 30 0-34  9)    Need for vaccination for Strep pneumoniae    Spasm of thoracic back muscle    Low density lipoprotein (LDL) cholesterol greater than 129 mg/dL    Vaccine counseling    Other fatigue       Past Medical History:   Diagnosis Date    Allergic     Allergic rhinitis     GERD (gastroesophageal reflux disease)     Hydrocephalus (HCC)     Hypertension     Obesity (BMI 30 0-34  9) 4/11/2018    Thalassemia 10/25/2017       Past Surgical History:   Procedure Laterality Date    NM COLONOSCOPY FLX DX W/COLLJ SPEC WHEN PFRMD N/A 2/8/2017    Procedure: COLONOSCOPY;  Surgeon: Kartik Paz MD;  Location: [facility];   Service: Gastroenterology    SHOULDER SURGERY      last assessed 3/8/2016   Hortensia Villalobos VENTRICULAR ATRIAL SHUNT      for hydrocephalus    VENTRICULOPERITONEAL SHUNT           Current Outpatient Medications:     fexofenadine ([facility]S Allergy Relief) 180 MG tablet, Take 180 mg by mouth daily cvs brand, Disp: , Rfl:     lisinopril (ZESTRIL) 10 mg tablet, Take 10 mg by mouth daily, Disp: , Rfl:     Omega-3 Fatty Acids (fish oil) 1,000 mg, TAKE 1 CAPSULE BY MOUTH DAILY, Disp: 60 capsule, Rfl: 1    fluticasone (FLONASE) 50 mcg/act nasal spray, 1 spray into each nostril daily (Patient not taking: Reported on 2/12/2021), Disp: 1 Bottle, Rfl: 3    Allergies   Allergen Reactions    Pollen Extract        Social History     Socioeconomic History    Marital status: /Civil Union     Spouse name: None    Number of children: None    Years of education: None    Highest education level: None   Occupational History    None   Social Needs    Financial resource strain: None    Food insecurity     Worry: None     Inability: None    Transportation needs     Medical: None     Non-medical: None   Tobacco Use    Smoking status: Never Smoker    Smokeless tobacco: Never Used   Substance and Sexual Activity    Alcohol use: Yes     Frequency: Monthly or less     Comment: occasionally    Drug use: No    Sexual activity: Yes   Lifestyle    Physical activity     Days per week: None     Minutes per session: None    Stress: None   Relationships    Social connections     Talks on phone: None     Gets together: None     Attends Taoism service: None     Active member of club or organization: None     Attends meetings of clubs or organizations: None     Relationship status: None    Intimate partner violence     Fear of current or ex partner: None     Emotionally abused: None     Physically abused: None     Forced sexual activity: None   Other Topics Concern    None   Social History Narrative    Activities:  Soccer       Family History   Problem Relation Age of Onset    Diabetes Mother         DM    Diabetes Father         DM    Hypertension Father     Diabetes Brother         DM    Heart disease Brother         problem    Liver disease Family         sibling    Lung cancer Family         Uncle    Diabetes type II Brother     Cirrhosis Brother     Diabetes type II Brother     Dialysis Brother     Thrombocytopenia Daughter        PHQ-9 Depression Screening    PHQ-9:   Frequency of the following problems over the past two weeks:      Little interest or pleasure in doing things: 0 - not at all  Feeling down, depressed, or hopeless: 0 - not at all  PHQ-2 Score: 0         Health Maintenance   Topic Date Due    Pneumococcal Vaccine: Pediatrics (0 to 5 Years) and At-Risk Patients (6 to 59 Years) (1 of 3 - PCV13) 11/22/1968    HIV Screening  11/22/1977    Annual Physical  11/22/1980    DTaP,Tdap,and Td Vaccines (1 - Tdap) 11/22/1983    Influenza Vaccine (1) 06/30/2021 (Originally 9/1/2020)    Depression Screening PHQ  10/16/2021    BMI: Followup Plan  10/16/2021    Colonoscopy Surveillance  02/08/2022    BMI: Adult  02/12/2022    Colorectal Cancer Screening  02/08/2027    Hepatitis C Screening  Completed    HIB Vaccine  Aged Out    Hepatitis B Vaccine  Aged Out    IPV Vaccine  Aged Out    Hepatitis A Vaccine  Aged Out    Meningococcal ACWY Vaccine  Aged Out    HPV Vaccine  Aged Out       There is no immunization history for the selected administration types on file for this patient  Objective:  Vitals:    02/12/21 1438 02/12/21 1504   BP: 130/76 134/84   BP Location: Left arm    Patient Position: Sitting    Cuff Size: Standard    Pulse: 71    Resp: 18    Temp: 97 7 °F (36 5 °C)    TempSrc: Tympanic    SpO2: 100%    Weight: 102 kg (225 lb 6 4 oz)    Height: 5' 11 5" (1 816 m)      Wt Readings from Last 3 Encounters:   02/12/21 102 kg (225 lb 6 4 oz)   10/16/20 101 kg (223 lb 9 6 oz)   05/29/20 96 6 kg (213 lb)     Body mass index is 31 kg/m²    No exam data present       Physical Exam  Vitals signs and nursing note reviewed  Constitutional:       General: He is not in acute distress  Appearance: He is well-developed  He is not diaphoretic  Comments: Alert pleasant cooperative seated in room in no acute distress  HENT:      Head: Normocephalic and atraumatic  Right Ear: Tympanic membrane normal       Left Ear: Tympanic membrane normal       Mouth/Throat:      Mouth: Mucous membranes are moist       Pharynx: No oropharyngeal exudate or posterior oropharyngeal erythema  Eyes:      General: No scleral icterus  Right eye: No discharge  Left eye: No discharge  Pupils: Pupils are equal, round, and reactive to light  Neck:      Musculoskeletal: Neck supple  Cardiovascular:      Rate and Rhythm: Normal rate and regular rhythm  Heart sounds: Normal heart sounds  No murmur  Comments: Regular rate rhythm  No audible murmurs  Pulmonary:      Effort: Pulmonary effort is normal  No respiratory distress  Breath sounds: Normal breath sounds  No wheezing or rales  Comments: Clear to auscultation throughout  No crackles no rhonchi no wheeze  Abdominal:      General: Bowel sounds are normal  There is no distension  Palpations: Abdomen is soft  Tenderness: There is no abdominal tenderness  There is no guarding  Comments: Positive bowel sounds soft nontender nondistended   Musculoskeletal:         General: No swelling  Lymphadenopathy:      Cervical: No cervical adenopathy  Skin:     General: Skin is warm and dry  Comments: Hypopigmented patches scattered on upper extremities neck and face   Neurological:      General: No focal deficit present  Mental Status: He is alert  Comments: No gross focal neurologic deficits on exam   Psychiatric:         Mood and Affect: Mood normal          Behavior: Behavior normal          Thought Content:  Thought content normal              Future Appointments   Date Time Provider Chaparrita Valdez 3/19/2021  2:00 PM Rei Reese PA-C [address] [place] [address]    Patient Care Team:  Rei Reese PA-C as PCP - General (Internal Medicine)  MD Yazan Hand Mc, Mc, MD as Endoscopist    This note was completed in part utilizing M-Modal Fluency Direct Software  Grammatical errors, random word insertions, spelling mistakes, and incomplete sentences can be an occasional consequence of this system secondary to software limitations, ambient noise, and hardware issues  If you have any questions or concerns about the content, text, or information contained within the body of this dictation, please contact the provider for clarification

## 2021-02-12 NOTE — ASSESSMENT & PLAN NOTE
For history symptoms appear to be related to decreased sleep patient has throughout his work week  He works night shift and on the weekends tries to return to a day shift schedule  Discussed importance of sleep hygiene and getting plenty of sleep  Avoidance of caffeine  Goal of 7 hours of sleep per night to allow for restorative sleep  Will obtain labs to rule out any other secondary causes of fatigue

## 2021-02-12 NOTE — PATIENT INSTRUCTIONS
Other fatigue  For history symptoms appear to be related to decreased sleep patient has throughout his work week  He works night shift and on the weekends tries to return to a day shift schedule  Discussed importance of sleep hygiene and getting plenty of sleep  Avoidance of caffeine  Goal of 7 hours of sleep per night to allow for restorative sleep  Will obtain labs to rule out any other secondary causes of fatigue  Essential hypertension  At last visit it was discussed increased blood pressure from 10 to 20 mg however patient has not yet made this change  I discussed the importance of blood pressure control and will gradually taper up his lisinopril dose  Advised him to increase lisinopril to 15 mg daily (1 and 1/2 tablets daily)  Monitor BP at home  BP goal <120/<80  Discussed if blood pressure is not at goal will then increase to 20 mg daily  Encouraged low-salt diet and daily exercise  Low density lipoprotein (LDL) cholesterol greater than 129 mg/dL  Will check lipid panel with next lab set    Give encouragement with low-fat low-cholesterol diet    Thalassemia  Will check B12 folate and iron panel due to fatigue and patient's underlying thalassemia

## 2021-03-19 ENCOUNTER — OFFICE VISIT (OUTPATIENT)
Dept: INTERNAL MEDICINE CLINIC | Facility: CLINIC | Age: 61
End: 2021-03-19
Payer: COMMERCIAL

## 2021-03-19 VITALS
BODY MASS INDEX: 30.75 KG/M2 | SYSTOLIC BLOOD PRESSURE: 142 MMHG | TEMPERATURE: 97 F | OXYGEN SATURATION: 98 % | DIASTOLIC BLOOD PRESSURE: 92 MMHG | WEIGHT: 227 LBS | HEART RATE: 85 BPM | HEIGHT: 72 IN

## 2021-03-19 DIAGNOSIS — E78.00 LOW DENSITY LIPOPROTEIN (LDL) CHOLESTEROL GREATER THAN 129 MG/DL: ICD-10-CM

## 2021-03-19 DIAGNOSIS — R79.89 PLATELET COUNT LESS 140,000 PER CUBIC MILLIMETER: ICD-10-CM

## 2021-03-19 DIAGNOSIS — R53.83 OTHER FATIGUE: ICD-10-CM

## 2021-03-19 DIAGNOSIS — D56.9 THALASSEMIA, UNSPECIFIED TYPE: ICD-10-CM

## 2021-03-19 DIAGNOSIS — J30.2 SEASONAL ALLERGIES: ICD-10-CM

## 2021-03-19 DIAGNOSIS — I10 ESSENTIAL HYPERTENSION: Primary | ICD-10-CM

## 2021-03-19 DIAGNOSIS — E66.9 OBESITY (BMI 30.0-34.9): ICD-10-CM

## 2021-03-19 PROBLEM — M62.830 SPASM OF THORACIC BACK MUSCLE: Status: RESOLVED | Noted: 2019-04-19 | Resolved: 2021-03-19

## 2021-03-19 PROCEDURE — 3080F DIAST BP >= 90 MM HG: CPT | Performed by: PHYSICIAN ASSISTANT

## 2021-03-19 PROCEDURE — 1036F TOBACCO NON-USER: CPT | Performed by: PHYSICIAN ASSISTANT

## 2021-03-19 PROCEDURE — 3077F SYST BP >= 140 MM HG: CPT | Performed by: PHYSICIAN ASSISTANT

## 2021-03-19 PROCEDURE — 99214 OFFICE O/P EST MOD 30 MIN: CPT | Performed by: PHYSICIAN ASSISTANT

## 2021-03-19 PROCEDURE — 3008F BODY MASS INDEX DOCD: CPT | Performed by: PHYSICIAN ASSISTANT

## 2021-03-19 RX ORDER — LISINOPRIL 20 MG/1
20 TABLET ORAL DAILY
Qty: 90 TABLET | Refills: 1 | Status: SHIPPED | OUTPATIENT
Start: 2021-03-19 | End: 2021-11-04 | Stop reason: SDUPTHER

## 2021-03-19 NOTE — PROGRESS NOTES
[facility]arly[facility]  Standard Office Visit  Patient ID: Liliana Egan    : 1960  Age/Gender: 64 y o  male     DATE: 3/19/2021      Assessment/Plan:    Essential hypertension  BP continues to be elevated  Discussed improtance of BP control  Recommend he increase lisinopril to 20 mg daily  New rx sent to pharmacy  Continue with exercise and daily activity which can help BP as well  Continue to monitor BP at home  Discussed BP goal    Low density lipoprotein (LDL) cholesterol greater than 129 mg/dL  Patient has made great progress on his cholesterol since last visit  Continue fish oil, low fat, low cholesterol diet  Obesity (BMI 30 0-34  9)  BMI 31  Patient reports he is trying to get back in shape with exercise  Gave encouragement  Other fatigue  Resolved  Will continue to follow    Seasonal allergies  Continue allegra  Ok to use flonase PRN  Thalassemia  Patient with h/o thallasemia, dx with prior provider  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  Patient does not currently follow with MARYANN  Serial CBC show Ptl have been trending down  Recommend he go for repeat non-fasting CBC to recheck ptl  Recommend [facility]ARY[facility] eval   Referral placed  Diagnoses and all orders for this visit:    Essential hypertension  -     lisinopril (ZESTRIL) 20 mg tablet; Take 1 tablet (20 mg total) by mouth daily    Other fatigue    Thalassemia, unspecified type  -     Ambulatory referral to Hematology / Oncology; Future    Low density lipoprotein (LDL) cholesterol greater than 129 mg/dL    Seasonal allergies    Platelet count less [phone] per cubic millimeter  -     Ambulatory referral to Hematology / Oncology; Future  -     CBC and differential; Future    Obesity (BMI 30 0-34  9)                Subjective:   Chief Complaint   Patient presents with    Follow-up     4 week follow up blood work done on 3/10/21    Hypertension         Radha Angulo I Petey Peters is a 64 y o  male who presents to the office on 3/19/2021 for     Follow up for labs and fatigue he was experiencing last visit  Since last visit he notes that he has been trying to sleep more  Notes he usually is sleeping at this time so he is a little tired today  Fridays are a busy day for him and he works night shift  Usually takes a nap after work to allow him to catch up on some sleep and then goes to bed early 6 PM   Notes when he has a restful sleep over the weekend he feels well rested and is not tired over the weekend  Taking lisinopril 10 mg daily  He has not increased the dose as we discussed last visit  Wanted to see if he could control his BP with diet and lifestyle alone  Wants to know if he can get off of BP meds in the future if hsi BP comes down  He notes COVID restrictions feels limiting to him  Prior enjoyed indoor soccer and exercising which has been reduced  He notes he feels strong physically  He notes he is feeling less fatigued  Notes warm weather helps to motivate him and since the weather changed he is feeing much beter  Playing soccer at home, jumping rope, trying to get back in shape  Using allegra every night which controls his allergy sx  Does not use Flonase at this time  No HA or dizziness, no visual changes  Notes that his daughter is having a baby and his wife may be retiring soon  He looks forward to being able to spend more time with his wife since they currently work different scheduled now  Hypertension  This is a chronic problem  The problem is unchanged  The problem is uncontrolled  Pertinent negatives include no blurred vision, chest pain, headaches, palpitations, peripheral edema or shortness of breath  Malaise/fatigue: improved  Risk factors for coronary artery disease include male gender and obesity  Past treatments include ACE inhibitors  The current treatment provides mild improvement  There are no compliance problems          The following portions of the patient's history were reviewed and updated as appropriate: allergies, current medications, past family history, past medical history, past social history, past surgical history and problem list     Review of Systems   Constitutional: Negative for chills and fever  Malaise/fatigue: improved  HENT: Negative for sore throat  Eyes: Negative for blurred vision and visual disturbance  Respiratory: Negative for cough, shortness of breath and wheezing  Cardiovascular: Negative for chest pain and palpitations  Gastrointestinal: Negative for abdominal pain, constipation, diarrhea, nausea and vomiting  Has had somewhat recent colonoscopy 2017   Genitourinary: Negative for dysuria  Musculoskeletal: Positive for arthralgias (chronic unchanged  H/O prior shoulder surgery  Join pain has been off and on)  Skin: Negative for rash  No bruising or bleeding  Daughter with h/o ITP    Nephew with h/o TTP   Neurological: Negative for dizziness, syncope, weakness, light-headedness and headaches  Patient Active Problem List   Diagnosis    Essential hypertension    High serum high density lipoprotein (HDL)    Hypopigmentation    Seasonal allergies    Thalassemia    Obesity (BMI 30 0-34  9)    Need for vaccination for Strep pneumoniae    Low density lipoprotein (LDL) cholesterol greater than 129 mg/dL    Vaccine counseling    Other fatigue       Past Medical History:   Diagnosis Date    Allergic     Allergic rhinitis     GERD (gastroesophageal reflux disease)     Hydrocephalus (HCC)     Hypertension     Obesity (BMI 30 0-34  9) 4/11/2018    Thalassemia 10/25/2017       Past Surgical History:   Procedure Laterality Date    NC COLONOSCOPY FLX DX W/COLLJ SPEC WHEN PFRMD N/A 2/8/2017    Procedure: COLONOSCOPY;  Surgeon: Thien Veras MD;  Location[facility];   Service: Gastroenterology    SHOULDER SURGERY      last assessed 3/8/2016    VENTRICULAR ATRIAL SHUNT for hydrocephalus    VENTRICULOPERITONEAL SHUNT           Current Outpatient Medications:     fexofenadine ([facility] Allergy Relief) 180 MG tablet, Take 180 mg by mouth daily cvs brand, Disp: , Rfl:     lisinopril (ZESTRIL) 20 mg tablet, Take 1 tablet (20 mg total) by mouth daily, Disp: 90 tablet, Rfl: 1    Omega-3 Fatty Acids (fish oil) 1,000 mg, TAKE 1 CAPSULE BY MOUTH DAILY, Disp: 60 capsule, Rfl: 1    fluticasone (FLONASE) 50 mcg/act nasal spray, 1 spray into each nostril daily (Patient not taking: Reported on 3/19/2021), Disp: 1 Bottle, Rfl: 3    Allergies   Allergen Reactions    Pollen Extract        Social History     Socioeconomic History    Marital status: /Civil Union     Spouse name: None    Number of children: None    Years of education: None    Highest education level: None   Occupational History    None   Social Needs    Financial resource strain: None    Food insecurity     Worry: None     Inability: None    Transportation needs     Medical: None     Non-medical: None   Tobacco Use    Smoking status: Never Smoker    Smokeless tobacco: Never Used   Substance and Sexual Activity    Alcohol use: Yes     Frequency: Monthly or less     Comment: occasionally    Drug use: No    Sexual activity: Yes   Lifestyle    Physical activity     Days per week: None     Minutes per session: None    Stress: None   Relationships    Social connections     Talks on phone: None     Gets together: None     Attends Buddhist service: None     Active member of club or organization: None     Attends meetings of clubs or organizations: None     Relationship status: None    Intimate partner violence     Fear of current or ex partner: None     Emotionally abused: None     Physically abused: None     Forced sexual activity: None   Other Topics Concern    None   Social History Narrative    Activities:  Soccer       Family History   Problem Relation Age of Onset    Diabetes Mother         DM    Diabetes Father         DM    Hypertension Father     Diabetes Brother         DM    Heart disease Brother         problem    Liver disease Family         sibling    Lung cancer Family         Uncle    Diabetes type II Brother     Cirrhosis Brother     Diabetes type II Brother     Dialysis Brother     Thrombocytopenia Daughter     Other Daughter         ITP    Other Other         TTP       PHQ-9 Depression Screening    PHQ-9:   Frequency of the following problems over the past two weeks:             Health Maintenance   Topic Date Due    Pneumococcal Vaccine: Pediatrics (0 to 5 Years) and At-Risk Patients (6 to 59 Years) (1 of 3 - PCV13) Never done    HIV Screening  Never done    COVID-19 Vaccine (1) Never done    Annual Physical  Never done    DTaP,Tdap,and Td Vaccines (1 - Tdap) Never done    Influenza Vaccine (1) 06/30/2021 (Originally 9/1/2020)    Colonoscopy Surveillance  02/08/2022    Depression Screening PHQ  02/12/2022    BMI: Followup Plan  02/12/2022    BMI: Adult  03/19/2022    Colorectal Cancer Screening  02/08/2027    Hepatitis C Screening  Completed    HIB Vaccine  Aged Out    Hepatitis B Vaccine  Aged Out    IPV Vaccine  Aged Out    Hepatitis A Vaccine  Aged Out    Meningococcal ACWY Vaccine  Aged Out    HPV Vaccine  Aged Out       There is no immunization history for the selected administration types on file for this patient  Objective:  Vitals:    03/19/21 1349 03/19/21 1436   BP: 144/92 142/92   BP Location: Left arm    Patient Position: Sitting    Cuff Size: Large    Pulse: 85    Temp: (!) 97 °F (36 1 °C)    SpO2: 98%    Weight: 103 kg (227 lb)    Height: 5' 11 5" (1 816 m)      Wt Readings from Last 3 Encounters:   03/19/21 103 kg (227 lb)   02/12/21 102 kg (225 lb 6 4 oz)   10/16/20 101 kg (223 lb 9 6 oz)     Body mass index is 31 22 kg/m²  No exam data present       Physical Exam  Vitals signs and nursing note reviewed     Constitutional:       General: He is not in acute distress  Appearance: He is well-developed  He is not diaphoretic  Comments: Alert, pleasant, cooperative, seated in room in NAD, talkitive   HENT:      Head: Normocephalic and atraumatic  Right Ear: Tympanic membrane normal       Left Ear: Tympanic membrane normal       Mouth/Throat:      Mouth: Mucous membranes are moist       Pharynx: No oropharyngeal exudate or posterior oropharyngeal erythema  Eyes:      General: No scleral icterus  Right eye: No discharge  Left eye: No discharge  Extraocular Movements: Extraocular movements intact  Pupils: Pupils are equal, round, and reactive to light  Neck:      Musculoskeletal: Neck supple  Cardiovascular:      Rate and Rhythm: Normal rate and regular rhythm  Heart sounds: Normal heart sounds  No murmur  Pulmonary:      Effort: Pulmonary effort is normal  No respiratory distress  Breath sounds: Normal breath sounds  No wheezing or rales  Comments: CTA bilaterally  Abdominal:      General: Bowel sounds are normal  There is no distension  Palpations: Abdomen is soft  Tenderness: There is no abdominal tenderness  There is no guarding  Comments: Soft, NT/ND +BS   Lymphadenopathy:      Cervical: No cervical adenopathy  Skin:     General: Skin is warm and dry  Findings: No rash  Neurological:      General: No focal deficit present  Mental Status: He is alert  Psychiatric:         Mood and Affect: Mood normal          Behavior: Behavior normal          Thought Content:  Thought content normal              Future Appointments   Date Time Provider Department Center   6/18/2021  2:00 PM Bhupinder Oswald PA-C [address] [place] [address]    Patient Care Team:  Bhupinder Oswald PA-C as PCP - General (Internal Medicine)  MD Vicki Baptiste MD as Endoscopist    This note was completed in part utilizing M-Modal Fluency Direct Software  Grammatical errors, random word insertions, spelling mistakes, and incomplete sentences can be an occasional consequence of this system secondary to software limitations, ambient noise, and hardware issues  If you have any questions or concerns about the content, text, or information contained within the body of this dictation, please contact the provider for clarification

## 2021-03-20 NOTE — PATIENT INSTRUCTIONS
Essential hypertension  BP continues to be elevated  Discussed improtance of BP control  Recommend he increase lisinopril to 20 mg daily  New rx sent to pharmacy  Continue with exercise and daily activity which can help BP as well  Continue to monitor BP at home  Discussed BP goal    Low density lipoprotein (LDL) cholesterol greater than 129 mg/dL  Patient has made great progress on his cholesterol since last visit  Continue fish oil, low fat, low cholesterol diet  Obesity (BMI 30 0-34  9)  BMI 31  Patient reports he is trying to get back in shape with exercise  Gave encouragement  Other fatigue  Resolved  Will continue to follow    Seasonal allergies  Continue allegra  Ok to use flonase PRN  Thalassemia  Patient with h/o thallasemia, dx with prior provider  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

## 2021-03-20 NOTE — ASSESSMENT & PLAN NOTE
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

## 2021-03-20 NOTE — ASSESSMENT & PLAN NOTE
BP continues to be elevated  Discussed improtance of BP control  Recommend he increase lisinopril to 20 mg daily  New rx sent to pharmacy  Continue with exercise and daily activity which can help BP as well  Continue to monitor BP at home    Discussed BP goal

## 2021-03-20 NOTE — ASSESSMENT & PLAN NOTE
Patient has made great progress on his cholesterol since last visit  Continue fish oil, low fat, low cholesterol diet

## 2021-04-20 DIAGNOSIS — E78.00 LOW DENSITY LIPOPROTEIN (LDL) CHOLESTEROL GREATER THAN 129 MG/DL: ICD-10-CM

## 2021-04-23 RX ORDER — CHLORAL HYDRATE 500 MG
CAPSULE ORAL
Qty: 90 CAPSULE | Refills: 1 | Status: SHIPPED | OUTPATIENT
Start: 2021-04-23 | End: 2021-06-18 | Stop reason: SDUPTHER

## 2021-06-18 ENCOUNTER — OFFICE VISIT (OUTPATIENT)
Dept: INTERNAL MEDICINE CLINIC | Facility: CLINIC | Age: 61
End: 2021-06-18
Payer: COMMERCIAL

## 2021-06-18 VITALS
TEMPERATURE: 98.7 F | BODY MASS INDEX: 29.93 KG/M2 | OXYGEN SATURATION: 98 % | WEIGHT: 221 LBS | HEIGHT: 72 IN | HEART RATE: 74 BPM | DIASTOLIC BLOOD PRESSURE: 88 MMHG | SYSTOLIC BLOOD PRESSURE: 130 MMHG

## 2021-06-18 DIAGNOSIS — R13.19 OTHER DYSPHAGIA: ICD-10-CM

## 2021-06-18 DIAGNOSIS — K21.9 GASTROESOPHAGEAL REFLUX DISEASE WITHOUT ESOPHAGITIS: Primary | ICD-10-CM

## 2021-06-18 DIAGNOSIS — I10 ESSENTIAL HYPERTENSION: ICD-10-CM

## 2021-06-18 DIAGNOSIS — E78.00 LOW DENSITY LIPOPROTEIN (LDL) CHOLESTEROL GREATER THAN 129 MG/DL: ICD-10-CM

## 2021-06-18 PROCEDURE — 93000 ELECTROCARDIOGRAM COMPLETE: CPT | Performed by: PHYSICIAN ASSISTANT

## 2021-06-18 PROCEDURE — 99214 OFFICE O/P EST MOD 30 MIN: CPT | Performed by: PHYSICIAN ASSISTANT

## 2021-06-18 RX ORDER — OMEPRAZOLE 40 MG/1
40 CAPSULE, DELAYED RELEASE ORAL DAILY
Qty: 30 CAPSULE | Refills: 1 | Status: SHIPPED | OUTPATIENT
Start: 2021-06-18 | End: 2021-07-13

## 2021-06-18 RX ORDER — CHLORAL HYDRATE 500 MG
1000 CAPSULE ORAL DAILY
Qty: 90 CAPSULE | Refills: 1 | Status: SHIPPED | OUTPATIENT
Start: 2021-06-18

## 2021-06-18 NOTE — PATIENT INSTRUCTIONS
Gastroesophageal reflux disease without esophagitis  EKG today NS changes and appears improved when compated to 2017 EKG  2017 stress echo unremarkable  Unlikely cardiac and appears to be secondary to reflux disorder however due to episode of dysphagia recommend patient be seen by Gastroenterology for further evaluation and treatment  Discussed with patient upper endoscopy may be needed to evaluate for any strictures or webs  Patient verbalized understanding is willing  He has since made drastic reductions to his coffee intake previously had been drinking a pot of coffee a day and has cut back  Discussed with patient importance of reduction in caffeine with a goal to discontinue caffeine completely  Start Prilosec 40 mg by mouth once daily  Use Tums to be taken as directed for any breakthrough heartburn symptoms  Discussed importance of avoiding any trigger foods spicy acidic foods caffeine alcohol, peppermint etc   Discussed importance of eating small meals throughout the day and avoidance of large heavy meals especially before going to bed  Other dysphagia  Referral to GI as above for evaluation and treatment  Essential hypertension  Stable at this time on medications  No dose change    Will continue to follow    Low density lipoprotein (LDL) cholesterol greater than 129 mg/dL  Continue healthy diet with fish oil supplements

## 2021-06-18 NOTE — ASSESSMENT & PLAN NOTE
EKG today non-specific changes and appears improved when compated to 2017 EKG  2017 stress echo unremarkable  Unlikely cardiac and appears to be secondary to reflux disorder however due to episode of dysphagia recommend patient be seen by Gastroenterology for further evaluation and treatment  Discussed with patient upper endoscopy may be needed to evaluate for any strictures or webs  Patient verbalized understanding is willing  He has since made drastic reductions to his coffee intake previously had been drinking a pot of coffee a day and has cut back  Discussed with patient importance of reduction in caffeine with a goal to discontinue caffeine completely  Start Prilosec 40 mg by mouth once daily  Use Tums to be taken as directed for any breakthrough heartburn symptoms  Discussed importance of avoiding any trigger foods spicy acidic foods caffeine alcohol, peppermint etc   Discussed importance of eating small meals throughout the day and avoidance of large heavy meals especially before going to bed  Discussed case and tx plan with Dr Wolf Gore

## 2021-06-18 NOTE — PROGRESS NOTES
[facility] [address] [place] [address]  Standard Office Visit  Patient ID: Carrwenceslaon Pac    : 1960  Age/Gender: 64 y o  male     DATE: 2021      Assessment/Plan:    Gastroesophageal reflux disease without esophagitis  EKG today non-specific changes and appears improved when compated to 2017 EKG  2017 stress echo unremarkable  Unlikely cardiac and appears to be secondary to reflux disorder however due to episode of dysphagia recommend patient be seen by Gastroenterology for further evaluation and treatment  Discussed with patient upper endoscopy may be needed to evaluate for any strictures or webs  Patient verbalized understanding is willing  He has since made drastic reductions to his coffee intake previously had been drinking a pot of coffee a day and has cut back  Discussed with patient importance of reduction in caffeine with a goal to discontinue caffeine completely  Start Prilosec 40 mg by mouth once daily  Use Tums to be taken as directed for any breakthrough heartburn symptoms  Discussed importance of avoiding any trigger foods spicy acidic foods caffeine alcohol, peppermint etc   Discussed importance of eating small meals throughout the day and avoidance of large heavy meals especially before going to bed  Discussed case and tx plan with Dr Elza Novak  Other dysphagia  Referral to GI as above for evaluation and treatment  Essential hypertension  Stable at this time on medications  No dose change  Will continue to follow    Low density lipoprotein (LDL) cholesterol greater than 129 mg/dL  Continue healthy diet with fish oil supplements       Diagnoses and all orders for this visit:    Gastroesophageal reflux disease without esophagitis  -     omeprazole (PriLOSEC) 40 MG capsule; Take 1 capsule (40 mg total) by mouth daily  -     POCT ECG  -     Ambulatory referral to Gastroenterology;  Future    Essential hypertension    Low density lipoprotein (LDL) cholesterol greater than 129 mg/dL  -     Omega-3 Fatty Acids (fish oil) 1,000 mg; Take 1 capsule (1,000 mg total) by mouth daily    Other dysphagia                Subjective:   Chief Complaint   Patient presents with    Follow-up     3 month follow up     Heartburn     C/O continued gerd feels it is getting worse    Hypertension         Ricky Mirza is a 64 y o  male who presents to the office on 6/18/2021 for     42-year-old male with history of GERD, hypertension, thalassemia presents to the office for chronic condition follow-up  He reports that he has been experiencing worsening of heartburn or reflux symptoms over the last 2-3 months  His symptoms are worse if he eats a heavy meal   He also notes that it is worse when he consumes caffeine which is a problem for him because he has been drinking 1 pot of coffee or more per day  He works night shift enjoys drinking coffee  He has since tried to cut back on his coffee and is now down to 1-2 cups a day  He has seen some improvement in his symptoms with the caffeine reduction  No prior EGD  He has had a prior colonoscopy  Worse with spicy acidic foods  Worse with caffeine  Worse with garlic  Symptoms improved after he burps  Symptoms improved after he takes Tums  No tobacco abuse history  Symptoms worse after he eats a heavy meal and tries to lay down at night  Does on occasion feel as if things are coming up in his throat, sour taste in his throat  He notes that this sx has caused him to feel more anxious when laying down  Sx present today for several months  Sx can be there at times and then fully go away  Burping more  When he burps his sx improve  Patient is very physically active in his symptoms do not bother him much throughout the day  Symptoms are more so when he lays down at night  He also notes that he has some left pectoral muscle soreness that he attributes to recently doing [employer] fly chest exercises    He is very active and does pushups at home as well  No limitation to his functional capacity  Notes he wants to get checked to make sure everything is ok  Taking lisinopril 20 mg as directed  Works night shift  Does not follow BP at home  Daughter and granddaughter are doing well  Stress echo 2017  Heartburn  He complains of abdominal pain (over stomach), belching, dysphagia (worse with some difficulty with solids if he does not carfully swallow   ), early satiety, heartburn and nausea  He reports no chest pain, no choking, no coughing, no sore throat or no wheezing  This is a recurrent problem  The current episode started more than 1 year ago  The problem occurs frequently  The problem has been waxing and waning  The symptoms are aggravated by certain foods, caffeine and lying down  Pertinent negatives include no anemia, fatigue, muscle weakness or orthopnea  Risk factors include caffeine use  Hypertension  This is a chronic problem  The current episode started more than 1 year ago  The problem is controlled  Pertinent negatives include no chest pain, headaches, palpitations or shortness of breath  The current treatment provides significant improvement  The following portions of the patient's history were reviewed and updated as appropriate: allergies, current medications, past family history, past medical history, past social history, past surgical history and problem list     Review of Systems   Constitutional: Negative for fatigue  HENT: Negative for sore throat  Respiratory: Negative for cough, choking, shortness of breath and wheezing  Cardiovascular: Negative for chest pain and palpitations  Gastrointestinal: Positive for abdominal pain (over stomach), dysphagia (worse with some difficulty with solids if he does not carfully swallow   ), heartburn and nausea  Negative for constipation, diarrhea and vomiting          Patient has had a recent colonoscopy   Musculoskeletal: Negative for muscle weakness  Neurological: Negative for dizziness, light-headedness and headaches  Patient Active Problem List   Diagnosis    Essential hypertension    Gastroesophageal reflux disease without esophagitis    High serum high density lipoprotein (HDL)    Hypopigmentation    Seasonal allergies    Thalassemia    Obesity (BMI 30 0-34  9)    Need for vaccination for Strep pneumoniae    Low density lipoprotein (LDL) cholesterol greater than 129 mg/dL    Vaccine counseling    Other fatigue    Other dysphagia       Past Medical History:   Diagnosis Date    Allergic     Allergic rhinitis     GERD (gastroesophageal reflux disease)     Hydrocephalus (HCC)     Hypertension     Obesity (BMI 30 0-34  9) 4/11/2018    Thalassemia 10/25/2017       Past Surgical History:   Procedure Laterality Date    PA COLONOSCOPY FLX DX W/COLLJ SPEC WHEN PFRMD N/A 2/8/2017    Procedure: COLONOSCOPY;  Surgeon: Rosalio Garcia MD;  Location[facility];   Service: Gastroenterology    SHOULDER SURGERY      last assessed 3/8/2016    VENTRICULAR ATRIAL SHUNT      for hydrocephalus    VENTRICULOPERITONEAL SHUNT           Current Outpatient Medications:     fexofenadine ([facility] Allergy Relief) 180 MG tablet, Take 180 mg by mouth daily cvs brand, Disp: , Rfl:     fluticasone (FLONASE) 50 mcg/act nasal spray, 1 spray into each nostril daily, Disp: 1 Bottle, Rfl: 3    lisinopril (ZESTRIL) 20 mg tablet, Take 1 tablet (20 mg total) by mouth daily, Disp: 90 tablet, Rfl: 1    Omega-3 Fatty Acids (fish oil) 1,000 mg, Take 1 capsule (1,000 mg total) by mouth daily, Disp: 90 capsule, Rfl: 1    omeprazole (PriLOSEC) 40 MG capsule, Take 1 capsule (40 mg total) by mouth daily, Disp: 30 capsule, Rfl: 1    Allergies   Allergen Reactions    Pollen Extract        Social History     Socioeconomic History    Marital status: /Civil Union     Spouse name: None    Number of children: None    Years of education: None    Highest education level: None   Occupational History    None   Tobacco Use    Smoking status: Never Smoker    Smokeless tobacco: Never Used   Vaping Use    Vaping Use: Never used   Substance and Sexual Activity    Alcohol use: Yes     Comment: occasionally    Drug use: No    Sexual activity: Yes   Other Topics Concern    None   Social History Narrative    Activities:  Soccer     Social Determinants of Health     Financial Resource Strain:     Difficulty of Paying Living Expenses:    Food Insecurity:     Worried About Running Out of Food in the Last Year:     Ran Out of Food in the Last Year:    Transportation Needs:     Lack of Transportation (Medical):      Lack of Transportation (Non-Medical):    Physical Activity:     Days of Exercise per Week:     Minutes of Exercise per Session:    Stress:     Feeling of Stress :    Social Connections:     Frequency of Communication with Friends and Family:     Frequency of Social Gatherings with Friends and Family:     Attends Religion Services:     Active Member of Clubs or Organizations:     Attends Club or Organization Meetings:     Marital Status:    Intimate Partner Violence:     Fear of Current or Ex-Partner:     Emotionally Abused:     Physically Abused:     Sexually Abused:        Family History   Problem Relation Age of Onset    Diabetes Mother         DM    Diabetes Father         DM    Hypertension Father     Diabetes Brother         DM    Heart disease Brother         problem    Liver disease Family         sibling    Lung cancer Family         Uncle    Diabetes type II Brother     Cirrhosis Brother     Diabetes type II Brother     Dialysis Brother     Thrombocytopenia Daughter     Other Daughter         ITP    Other Other         TTP       PHQ-9 Depression Screening    PHQ-9:   Frequency of the following problems over the past two weeks:             Health Maintenance   Topic Date Due    Pneumococcal Vaccine: Pediatrics (0 to 5 Years) and At-Risk Patients (6 to 59 Years) (1 of 4 - PCV13) Never done    COVID-19 Vaccine (1) Never done    HIV Screening  Never done    Annual Physical  Never done    DTaP,Tdap,and Td Vaccines (1 - Tdap) Never done    Influenza Vaccine (Season Ended) 09/01/2021    Colorectal Cancer Screening  02/08/2022    Depression Screening PHQ  02/12/2022    BMI: Followup Plan  02/12/2022    BMI: Adult  06/18/2022    Hepatitis C Screening  Completed    HIB Vaccine  Aged Out    Hepatitis B Vaccine  Aged Out    IPV Vaccine  Aged Out    Hepatitis A Vaccine  Aged Out    Meningococcal ACWY Vaccine  Aged Out    HPV Vaccine  Aged Out       There is no immunization history for the selected administration types on file for this patient  Objective:  Vitals:    06/18/21 1344   BP: 130/88   BP Location: Left arm   Patient Position: Sitting   Cuff Size: Adult   Pulse: 74   Temp: 98 7 °F (37 1 °C)   SpO2: 98%   Weight: 100 kg (221 lb)   Height: 5' 11 5" (1 816 m)     Wt Readings from Last 3 Encounters:   06/18/21 100 kg (221 lb)   03/19/21 103 kg (227 lb)   02/12/21 102 kg (225 lb 6 4 oz)     Body mass index is 30 39 kg/m²  No exam data present       Physical Exam  Vitals and nursing note reviewed  Constitutional:       General: He is not in acute distress  Appearance: He is well-developed  He is not diaphoretic  Comments: Alert pleasant cooperative  Seated in room in no acute distress   HENT:      Head: Normocephalic and atraumatic  Comments:  shunt right parietal area     Right Ear: Tympanic membrane normal       Left Ear: Tympanic membrane normal       Mouth/Throat:      Mouth: Mucous membranes are moist       Pharynx: No oropharyngeal exudate or posterior oropharyngeal erythema  Eyes:      General: No scleral icterus  Right eye: No discharge  Left eye: No discharge  Pupils: Pupils are equal, round, and reactive to light     Cardiovascular:      Rate and Rhythm: Normal rate and regular rhythm  Heart sounds: Normal heart sounds  No murmur heard  Pulmonary:      Effort: Pulmonary effort is normal  No respiratory distress  Breath sounds: Normal breath sounds  No wheezing or rales  Comments: Clear to auscultation  No crackles no rhonchi no wheeze  Abdominal:      General: Bowel sounds are normal  There is no distension  Palpations: Abdomen is soft  Tenderness: There is no abdominal tenderness  There is no guarding  Comments: Positive bowel sound  No epigastric tenderness to palpation  Negative Steele's   Musculoskeletal:        Arms:       Cervical back: Neck supple  Skin:     General: Skin is warm and dry  Comments: Right lower quadrant surgical well healed  Nontender   Neurological:      General: No focal deficit present  Mental Status: He is alert and oriented to person, place, and time  Psychiatric:         Behavior: Behavior normal          Thought Content: Thought content normal              Future Appointments   Date Time Provider Chaparrita Courtney   7/30/2021  1:30 PM Kenton Schrader PA-C [address] [place] [address]    Patient Care Team:  Kenton Schrader PA-C as PCP - General (Internal Medicine)  MD Mell Resendez MD as Endoscopist    This note was completed in part utilizing [email or web address] Direct Software  Grammatical errors, random word insertions, spelling mistakes, and incomplete sentences can be an occasional consequence of this system secondary to software limitations, ambient noise, and hardware issues  If you have any questions or concerns about the content, text, or information contained within the body of this dictation, please contact the provider for clarification

## 2021-07-10 DIAGNOSIS — K21.9 GASTROESOPHAGEAL REFLUX DISEASE WITHOUT ESOPHAGITIS: ICD-10-CM

## 2021-07-13 RX ORDER — OMEPRAZOLE 40 MG/1
CAPSULE, DELAYED RELEASE ORAL
Qty: 30 CAPSULE | Refills: 1 | Status: SHIPPED | OUTPATIENT
Start: 2021-07-13 | End: 2022-03-18 | Stop reason: ALTCHOICE

## 2021-11-04 DIAGNOSIS — I10 ESSENTIAL HYPERTENSION: ICD-10-CM

## 2021-11-04 RX ORDER — LISINOPRIL 20 MG/1
20 TABLET ORAL DAILY
Qty: 90 TABLET | Refills: 1 | Status: SHIPPED | OUTPATIENT
Start: 2021-11-04 | End: 2022-05-16 | Stop reason: SDUPTHER

## 2021-11-12 ENCOUNTER — OFFICE VISIT (OUTPATIENT)
Dept: INTERNAL MEDICINE CLINIC | Facility: CLINIC | Age: 61
End: 2021-11-12
Payer: COMMERCIAL

## 2021-11-12 VITALS
WEIGHT: 225 LBS | SYSTOLIC BLOOD PRESSURE: 134 MMHG | HEIGHT: 72 IN | BODY MASS INDEX: 30.48 KG/M2 | TEMPERATURE: 98.2 F | HEART RATE: 68 BPM | DIASTOLIC BLOOD PRESSURE: 86 MMHG | OXYGEN SATURATION: 99 %

## 2021-11-12 DIAGNOSIS — K21.9 GASTROESOPHAGEAL REFLUX DISEASE WITHOUT ESOPHAGITIS: Primary | ICD-10-CM

## 2021-11-12 DIAGNOSIS — I10 ESSENTIAL HYPERTENSION: ICD-10-CM

## 2021-11-12 DIAGNOSIS — E66.9 OBESITY (BMI 30.0-34.9): ICD-10-CM

## 2021-11-12 DIAGNOSIS — E78.00 LOW DENSITY LIPOPROTEIN (LDL) CHOLESTEROL GREATER THAN 129 MG/DL: ICD-10-CM

## 2021-11-12 DIAGNOSIS — Z71.85 VACCINE COUNSELING: ICD-10-CM

## 2021-11-12 DIAGNOSIS — D56.9 THALASSEMIA, UNSPECIFIED TYPE: ICD-10-CM

## 2021-11-12 PROCEDURE — 1036F TOBACCO NON-USER: CPT | Performed by: PHYSICIAN ASSISTANT

## 2021-11-12 PROCEDURE — 3008F BODY MASS INDEX DOCD: CPT | Performed by: PHYSICIAN ASSISTANT

## 2021-11-12 PROCEDURE — 99214 OFFICE O/P EST MOD 30 MIN: CPT | Performed by: PHYSICIAN ASSISTANT

## 2021-11-15 ENCOUNTER — TELEPHONE (OUTPATIENT)
Dept: HEMATOLOGY ONCOLOGY | Facility: CLINIC | Age: 61
End: 2021-11-15

## 2021-11-26 ENCOUNTER — TELEPHONE (OUTPATIENT)
Dept: HEMATOLOGY ONCOLOGY | Facility: CLINIC | Age: 61
End: 2021-11-26

## 2022-03-18 ENCOUNTER — OFFICE VISIT (OUTPATIENT)
Dept: INTERNAL MEDICINE CLINIC | Age: 62
End: 2022-03-18
Payer: COMMERCIAL

## 2022-03-18 VITALS
SYSTOLIC BLOOD PRESSURE: 130 MMHG | OXYGEN SATURATION: 97 % | DIASTOLIC BLOOD PRESSURE: 80 MMHG | HEART RATE: 60 BPM | WEIGHT: 217 LBS | HEIGHT: 72 IN | BODY MASS INDEX: 29.39 KG/M2 | TEMPERATURE: 97.9 F

## 2022-03-18 DIAGNOSIS — I10 ESSENTIAL HYPERTENSION: Primary | ICD-10-CM

## 2022-03-18 DIAGNOSIS — D56.9 THALASSEMIA, UNSPECIFIED TYPE: ICD-10-CM

## 2022-03-18 DIAGNOSIS — M79.644 BILATERAL THUMB PAIN: ICD-10-CM

## 2022-03-18 DIAGNOSIS — D69.6 DECREASED PLATELET COUNT (HCC): ICD-10-CM

## 2022-03-18 DIAGNOSIS — K21.9 GASTROESOPHAGEAL REFLUX DISEASE WITHOUT ESOPHAGITIS: ICD-10-CM

## 2022-03-18 DIAGNOSIS — M79.645 BILATERAL THUMB PAIN: ICD-10-CM

## 2022-03-18 DIAGNOSIS — E78.00 LOW DENSITY LIPOPROTEIN (LDL) CHOLESTEROL GREATER THAN 129 MG/DL: ICD-10-CM

## 2022-03-18 DIAGNOSIS — R79.89 PLATELET COUNT LESS 140,000 PER CUBIC MILLIMETER: ICD-10-CM

## 2022-03-18 DIAGNOSIS — Z71.85 VACCINE COUNSELING: ICD-10-CM

## 2022-03-18 PROBLEM — R53.83 OTHER FATIGUE: Status: RESOLVED | Noted: 2021-02-12 | Resolved: 2022-03-18

## 2022-03-18 PROCEDURE — 3008F BODY MASS INDEX DOCD: CPT | Performed by: PHYSICIAN ASSISTANT

## 2022-03-18 PROCEDURE — 99214 OFFICE O/P EST MOD 30 MIN: CPT | Performed by: PHYSICIAN ASSISTANT

## 2022-03-18 PROCEDURE — 1036F TOBACCO NON-USER: CPT | Performed by: PHYSICIAN ASSISTANT

## 2022-03-18 PROCEDURE — 3725F SCREEN DEPRESSION PERFORMED: CPT | Performed by: PHYSICIAN ASSISTANT

## 2022-03-18 RX ORDER — OMEPRAZOLE 20 MG/1
20 CAPSULE, DELAYED RELEASE ORAL DAILY
Qty: 30 CAPSULE | Refills: 2 | Status: SHIPPED | OUTPATIENT
Start: 2022-03-18 | End: 2022-05-24

## 2022-03-18 NOTE — PROGRESS NOTES
[facility] [place]  Standard Office Visit  Patient ID: Jazmin Rosario    : 1960  Age/Gender: 63 y o  male     DATE: 3/18/2022      Assessment/Plan:    Low density lipoprotein (LDL) cholesterol greater than 129 mg/dL  ASCVD score calculated 7 7%  Discussed risks and benefits of statin  Recommend CAC score can help to further risk stratify  Patient will consider getting this test and is not interested in statin at this time  Essential hypertension  Per office reading and review of home BP it does not appear that BP is at goal   Increase lisinopril from 20mg daily to 30mg daily  Monitor BP at home  Encourage continued diet and lifestyle modifications  BP goal <130 / <80  Gastroesophageal reflux disease without esophagitis  Sx much better than they were with diet and lifestyle modifications  Colonoscopy  recommending 5 yr follow up  Mild intermittent GERD and due to hx recommended he schedule with GI for both colonoscopy as well as GERD follow up  Ok to continue low dose prilosec at 20mg along with diet and lifestyle modifications  Encouraged GI follow up  Thalassemia  Recommend he schedule with hematology for ongoing follow up which patient will consider but is not interested in at this time  Encouraged he take daily multivitamin and folate supplement  Discussed vaccines which are needed for HM and some that are recommended due to thalassemia but patient is not interested  Vaccine counseling  Discussed risks and benefits of vaccines (flu, pneumonia, tetanus, shingles) patient verbalized understanding but is not interested  Diagnoses and all orders for this visit:    Essential hypertension    Gastroesophageal reflux disease without esophagitis  -     omeprazole (PriLOSEC) 20 mg delayed release capsule; Take 1 capsule (20 mg total) by mouth daily  -     Ambulatory Referral to Gastroenterology;  Future    Low density lipoprotein (LDL) cholesterol greater than 129 mg/dL  -     CT coronary calcium score; Future    Thalassemia, unspecified type  -     Ambulatory Referral to Hematology / Oncology; Future    Bilateral thumb pain  Comments:  likely arthritis due to overuse  Recommend topical voltarin as needed along wiht rest   If sx persist recommend xray and ortho eval     Orders:  -     Diclofenac Sodium (VOLTAREN) 1 %; Apply 2 g topically 3 (three) times a day as needed (thumb pain)  -     XR thumb right first digit-min 2v; Future  -     XR thumb left first digit-min 2v; Future    Vaccine counseling    Decreased platelet count (HCC)  -     CBC and differential; Future    Platelet count less 321,668 per cubic millimeter  Comments:  Recommend repeat CBC in 3 weeks  Recommend hematology eval          BMI Counseling: Body mass index is 29 84 kg/m²  The BMI is above normal  Nutrition recommendations include encouraging healthy choices of fruits and vegetables and increasing intake of lean protein  Exercise recommendations include exercising 3-5 times per week  No pharmacotherapy was ordered  Patient referred to PCP  Rationale for BMI follow-up plan is due to patient being overweight or obese  Depression Screening and Follow-up Plan: Patient was screened for depression during today's encounter  They screened negative with a PHQ-2 score of 0  Subjective:   Chief Complaint   Patient presents with    Follow-up     REVIEW LABS 3/2         COLONOSCOPY, DEPRESSION ,ANNUAL PHYSICAL          Yanricci aHyes is a 63 y o  male who presents to the office on 3/18/2022 for       64 yo male with h/o thalassemia, HTN, hyperlipidemia presents to office for chronic condition follow up  Since last visit patient notes he has been doing very well  He does check his BP at home  Good control on lisinopril  Systolic mid to upper 706'T and diastolic mid 93'P  He has continued to try to follow a low salt diet and continues to be active  Notes he plays soccer 1x per week for 2 hours  He notes that with weather improving he will increase to 2 x per week  Does weight training  Since he reduced his coffee intake he no longer gets heartburn or reflex  He notes that his Prilosec worked well  Patient notes he does have aches and pains from overuse injuries at work  Works delivering bread  In the past had his L shoulder operated on  At times gets some pains in his L shoulder but this does not happen all the time  He does get pain at the base of his thumbs bilaterally  NO trauma but has h/o overuse with work  He is R hand dominant  Both hands bother him  Describes as pain  No weakness, numbness or tingling  H/O  shunt in the past   No residual difficulties  No HA or dizziness  He has not yet scheduled with GI for follow up colonoscopy  HE has not scheduled with hematology as he does not feel it is necesary  Not interested in any vaccines  Hypertension  This is a chronic problem  The current episode started more than 1 year ago  The problem has been waxing and waning since onset  The problem is controlled  Pertinent negatives include no blurred vision, chest pain, headaches, malaise/fatigue, palpitations, peripheral edema or shortness of breath  Risk factors for coronary artery disease include obesity, male gender and dyslipidemia  Past treatments include ACE inhibitors  There are no compliance problems  Hand Pain   The incident occurred more than 1 week ago  There was no injury mechanism  The pain is present in the right hand and left hand  The quality of the pain is described as aching  The pain does not radiate  The pain is mild  The pain has been fluctuating since the incident  Pertinent negatives include no chest pain, muscle weakness, numbness or tingling  He has tried rest for the symptoms         The following portions of the patient's history were reviewed and updated as appropriate: allergies, current medications, past family history, past medical history, past social history, past surgical history and problem list     Review of Systems   Constitutional: Negative for chills, fever and malaise/fatigue  HENT: Negative for sore throat  Eyes: Negative for blurred vision and visual disturbance  Respiratory: Negative for cough, shortness of breath and wheezing  Cardiovascular: Negative for chest pain and palpitations  Gastrointestinal: Negative for abdominal pain, diarrhea, nausea and vomiting  Genitourinary: Negative for dysuria  Musculoskeletal:        Painful joint at the base of his thumb bilaterally  Has not taken anything to pain  Skin: Negative for rash  Hypopigmentation unchanged  NO rash or bruising  Neurological: Negative for dizziness, tingling, syncope, weakness, light-headedness, numbness and headaches  Psychiatric/Behavioral: Negative for agitation  Patient Active Problem List   Diagnosis    Essential hypertension    Gastroesophageal reflux disease without esophagitis    High serum high density lipoprotein (HDL)    Hypopigmentation    Seasonal allergies    Thalassemia    Obesity (BMI 30 0-34  9)    Need for vaccination for Strep pneumoniae    Low density lipoprotein (LDL) cholesterol greater than 129 mg/dL    Vaccine counseling    Other dysphagia       Past Medical History:   Diagnosis Date    Allergic     Allergic rhinitis     GERD (gastroesophageal reflux disease)     Hydrocephalus (HCC)     Hypertension     Obesity (BMI 30 0-34  9) 4/11/2018    Thalassemia 10/25/2017       Past Surgical History:   Procedure Laterality Date    RI COLONOSCOPY FLX DX W/COLLJ SPEC WHEN PFRMD N/A 2/8/2017    Procedure: COLONOSCOPY;  Surgeon: Joe Gifford MD;  Location: [facility];   Service: Gastroenterology    SHOULDER SURGERY      last assessed 3/8/2016    VENTRICULAR ATRIAL SHUNT      for hydrocephalus    VENTRICULOPERITONEAL SHUNT           Current Outpatient Medications:    lisinopril (ZESTRIL) 20 mg tablet, Take 1 tablet (20 mg total) by mouth daily, Disp: 90 tablet, Rfl: 1    Omega-3 Fatty Acids (fish oil) 1,000 mg, Take 1 capsule (1,000 mg total) by mouth daily, Disp: 90 capsule, Rfl: 1    Diclofenac Sodium (VOLTAREN) 1 %, Apply 2 g topically 3 (three) times a day as needed (thumb pain), Disp: 50 g, Rfl: 1    omeprazole (PriLOSEC) 20 mg delayed release capsule, Take 1 capsule (20 mg total) by mouth daily, Disp: 30 capsule, Rfl: 2    Allergies   Allergen Reactions    Pollen Extract        Social History     Socioeconomic History    Marital status: /Civil Union     Spouse name: None    Number of children: None    Years of education: None    Highest education level: None   Occupational History    None   Tobacco Use    Smoking status: Never Smoker    Smokeless tobacco: Never Used   Vaping Use    Vaping Use: Never used   Substance and Sexual Activity    Alcohol use: Yes     Comment: occasionally    Drug use: No    Sexual activity: Yes   Other Topics Concern    None   Social History Narrative    Activities:  Soccer     Social Determinants of Health     Financial Resource Strain: Not on file   Food Insecurity: Not on file   Transportation Needs: Not on file   Physical Activity: Not on file   Stress: Not on file   Social Connections: Not on file   Intimate Partner Violence: Not on file   Housing Stability: Not on file       Family History   Problem Relation Age of Onset    Diabetes Mother         DM    Diabetes Father         DM    Hypertension Father     Diabetes Brother         DM    Heart disease Brother         problem    Liver disease Family         sibling    Lung cancer Family         Uncle    Diabetes type II Brother     Cirrhosis Brother     Diabetes type II Brother     Dialysis Brother     Thrombocytopenia Daughter     Other Daughter         ITP    Other Other         TTP       PHQ-2/9 Depression Screening    Little interest or pleasure in doing things: 0 - not at all  Feeling down, depressed, or hopeless: 0 - not at all  PHQ-2 Score: 0  PHQ-2 Interpretation: Negative depression screen         Health Maintenance   Topic Date Due    COVID-19 Vaccine (1) Never done    Pneumococcal Vaccine: Pediatrics (0 to 5 Years) and At-Risk Patients (6 to 59 Years) (1 of 4 - PCV13) Never done    HIV Screening  Never done    Annual Physical  Never done    DTaP,Tdap,and Td Vaccines (1 - Tdap) Never done    Influenza Vaccine (1) Never done    Colorectal Cancer Screening  02/08/2022    BMI: Followup Plan  11/12/2022    Depression Screening  03/18/2023    BMI: Adult  03/18/2023    Hepatitis C Screening  Completed    HIB Vaccine  Aged Out    Hepatitis B Vaccine  Aged Out    IPV Vaccine  Aged Out    Hepatitis A Vaccine  Aged Out    Meningococcal ACWY Vaccine  Aged Out    HPV Vaccine  Aged Out       There is no immunization history for the selected administration types on file for this patient  Objective:  Vitals:    03/18/22 1309   BP: 130/80   BP Location: Left arm   Patient Position: Sitting   Cuff Size: Adult   Pulse: 60   Temp: 97 9 °F (36 6 °C)   TempSrc: Temporal   SpO2: 97%   Weight: 98 4 kg (217 lb)   Height: 5' 11 5" (1 816 m)     Wt Readings from Last 3 Encounters:   03/18/22 98 4 kg (217 lb)   11/12/21 102 kg (225 lb)   06/18/21 100 kg (221 lb)     Body mass index is 29 84 kg/m²  No exam data present       Physical Exam  Vitals and nursing note reviewed  Constitutional:       General: He is not in acute distress  Appearance: He is well-developed  He is not diaphoretic  HENT:      Head: Normocephalic and atraumatic  Comments:  shunt R temporal area, non-tender     Right Ear: Tympanic membrane normal       Left Ear: Tympanic membrane normal       Mouth/Throat:      Mouth: Mucous membranes are moist       Pharynx: No oropharyngeal exudate or posterior oropharyngeal erythema  Eyes:      General: No scleral icterus          Right eye: No discharge  Left eye: No discharge  Extraocular Movements: Extraocular movements intact  Pupils: Pupils are equal, round, and reactive to light  Cardiovascular:      Rate and Rhythm: Normal rate and regular rhythm  Heart sounds: Normal heart sounds  No murmur heard  Pulmonary:      Effort: Pulmonary effort is normal  No respiratory distress  Breath sounds: Normal breath sounds  No wheezing or rales  Abdominal:      General: Bowel sounds are normal  There is no distension  Palpations: Abdomen is soft  Tenderness: There is no abdominal tenderness  There is no guarding  Comments: Soft, NT/ND, +BS   Musculoskeletal:      Right hand: Deformity and tenderness present  No swelling, lacerations or bony tenderness  Normal range of motion  Normal strength  Normal sensation  Normal capillary refill  Left hand: Deformity and tenderness present  No swelling, lacerations or bony tenderness  Normal range of motion  Normal strength  Normal sensation  Normal capillary refill  Arms:       Cervical back: Neck supple  Right lower leg: No edema  Left lower leg: No edema  Skin:     General: Skin is warm and dry  Comments: Hypopigmented rash on neck and upper extremities   Neurological:      General: No focal deficit present  Mental Status: He is alert  Psychiatric:         Behavior: Behavior normal          Thought Content: Thought content normal              Future Appointments   Date Time Provider Chaparrita Courtney   9/2/2022  1:00 PM Comer Hatchet, PA-C [address] [place] [address], [address] [place] [address]    Patient Care Team:  Comer Hatchet, PA-C as PCP - General (Internal Medicine)  MD Juancarlos Craig MD as Endoscopist    This note was completed in part utilizing [employer] Software    Grammatical errors, random word insertions, spelling mistakes, and incomplete sentences can be an occasional consequence of this system secondary to software limitations, ambient noise, and hardware issues  If you have any questions or concerns about the content, text, or information contained within the body of this dictation, please contact the provider for clarification

## 2022-03-18 NOTE — ASSESSMENT & PLAN NOTE
ASCVD score calculated 7 7%  Discussed risks and benefits of statin  Recommend CAC score can help to further risk stratify  Patient will consider getting this test and is not interested in statin at this time

## 2022-03-18 NOTE — ASSESSMENT & PLAN NOTE
Recommend he schedule with hematology for ongoing follow up which patient will consider but is not interested in at this time  Encouraged he take daily multivitamin and folate supplement  Discussed vaccines which are needed for HM and some that are recommended due to thalassemia but patient is not interested

## 2022-03-18 NOTE — ASSESSMENT & PLAN NOTE
Sx much better than they were with diet and lifestyle modifications  Colonoscopy 2017 recommending 5 yr follow up  Mild intermittent GERD and due to hx recommended he schedule with GI for both colonoscopy as well as GERD follow up  Ok to continue low dose prilosec at 20mg along with diet and lifestyle modifications  Encouraged GI follow up

## 2022-03-18 NOTE — ASSESSMENT & PLAN NOTE
Per office reading and review of home BP it does not appear that BP is at goal   Increase lisinopril from 20mg daily to 30mg daily  Monitor BP at home  Encourage continued diet and lifestyle modifications  BP goal <130 / <80

## 2022-03-19 NOTE — ASSESSMENT & PLAN NOTE
Discussed risks and benefits of vaccines (flu, pneumonia, tetanus, shingles) patient verbalized understanding but is not interested

## 2022-03-19 NOTE — PATIENT INSTRUCTIONS
Low density lipoprotein (LDL) cholesterol greater than 129 mg/dL  ASCVD score calculated 7 7%  Discussed risks and benefits of statin  Recommend CAC score can help to further risk stratify  Patient will consider getting this test and is not interested in statin at this time  Essential hypertension  Per office reading and review of home BP it does not appear that BP is at goal   Increase lisinopril from 20mg daily to 30mg daily  Monitor BP at home  Encourage continued diet and lifestyle modifications  BP goal <130 / <80  Gastroesophageal reflux disease without esophagitis  Sx much better than they were with diet and lifestyle modifications  Colonoscopy 2017 recommending 5 yr follow up  Mild intermittent GERD and due to hx recommended he schedule with GI for both colonoscopy as well as GERD follow up  Ok to continue low dose prilosec at 20mg along with diet and lifestyle modifications  Encouraged GI follow up  Thalassemia  Recommend he schedule with hematology for ongoing follow up which patient will consider but is not interested in at this time  Encouraged he take daily multivitamin and folate supplement  Discussed vaccines which are needed for HM and some that are recommended due to thalassemia but patient is not interested  Vaccine counseling  Discussed risks and benefits of vaccines (flu, pneumonia, tetanus, shingles) patient verbalized understanding but is not interested

## 2022-03-22 ENCOUNTER — TELEPHONE (OUTPATIENT)
Dept: HEMATOLOGY ONCOLOGY | Facility: CLINIC | Age: 62
End: 2022-03-22

## 2022-03-23 ENCOUNTER — AMB VIDEO VISIT (OUTPATIENT)
Dept: OTHER | Facility: HOSPITAL | Age: 62
End: 2022-03-23

## 2022-03-23 DIAGNOSIS — R51.9 NONINTRACTABLE HEADACHE, UNSPECIFIED CHRONICITY PATTERN, UNSPECIFIED HEADACHE TYPE: Primary | ICD-10-CM

## 2022-03-23 PROCEDURE — ECARE PR SL URGENT CARE VIRTUAL VISIT: Performed by: HOSPITALIST

## 2022-03-23 NOTE — PROGRESS NOTES
Video Visit - Leigh Leal 63 y o  male MRN: [id number]    REQUIRED DOCUMENTATION:         1  This service was provided via [facility]  2  Provider located at [address] [place] [id number]  3  [facility] provider: Andressa Hays PA-C   4  Identify all parties in room with patient during [facility] visit:  significant other-permission granted  5  After connecting through [employer], patient was identified by name and date of birth  Patient was then informed that this was a Telemedicine visit and that the exam was being conducted confidentially over secure lines  My office door was closed  No one else was in the room  Patient acknowledged consent and understanding of privacy and security of the Telemedicine visit  I informed the patient that I have reviewed their record in Epic and presented the opportunity for them to ask any questions regarding the visit today  The patient agreed to participate  VITALS: Heart Rate: 67 BPM, Respiratory Rate: RPM, Temperature 98 4° F, Blood Pressure 127/81 mmHg    HPI  Patient presents in bed with a headache for the past two days  It is a constant headache and staying about the same  His wife is at bedside helping with the history  Wife states that the patient seems a bit more forgetful lately  He is not slurring his words  Patient told me he has not taken anything for the headache but wife reports giving him tylenol  He does have a shunt for hydrocephalus  He has not had any issues with the shunt in the past  He denies any cold symptoms  He denies any blurry vision, double vision, nausea, vomiting  Physical Exam  Vitals reviewed  Constitutional:       General: He is not in acute distress  Appearance: Normal appearance  He is not ill-appearing, toxic-appearing or diaphoretic  Comments: Pt laying in bed   HENT:      Head: Normocephalic and atraumatic  Cardiovascular:      Pulses: Normal pulses     Pulmonary:      Effort: Pulmonary effort is normal  No respiratory distress  Skin:     General: Skin is dry  Neurological:      Mental Status: He is alert  Comments: No asymmetry on face  Psychiatric:         Mood and Affect: Mood normal          Behavior: Behavior normal          Thought Content: Thought content normal          Judgment: Judgment normal          Diagnoses and all orders for this visit:    Nonintractable headache, unspecified chronicity pattern, unspecified headache type      Patient Instructions   Due to the headache not responding to tylenol and history of hydrocephalus with a shunt placement and the wife reporting forgetfullness, I recommend the patient go to the hospital for further evaluation  Wife is babysitting and will have to find someone to watch the baby before taking him to the hospital   I recommend she call 911 if any symptoms worsen

## 2022-03-23 NOTE — CARE ANYWHERE EVISITS
Visit Summary for Elda Kent - Gender: Male - Date of Birth: [id number]  Date: [id number] - Duration: 12 minutes  Patient: Elda Or   JEFF  Provider: Ajit Wills PA-C    Patient Contact Information  Address  [address] [place] [address]  [phone]    Visit Topics  Headache [Added By: Self - 2022-03-23]    Triage Questions   What is your current physical address in the event of a medical emergency? Answer []  Are you allergic to any medications? Answer []  Are you now or could you be pregnant? Answer []  Do you have any immune system compromise or chronic lung   disease? Answer []  Do you have any vulnerable family members in the home (infant, pregnant, cancer, elderly)? Answer []     Conversation Transcripts  [0A][0A] [Notification] You are connected with Ajit Wills PA-C, Urgent Care Specialist [0A][Notification] Lele Vaca is located in [facility]  [0A][Notification] Lele Vaca has shared health history  Arcadio Dietz  [0A]    Diagnosis  Headache    Procedures  Value: [id number] Code: CPT-4 UNLISTED E&M SERVICE    Medications Prescribed    No prescriptions ordered    Electronically signed by: Radha Monson(NPI [id number])

## 2022-03-23 NOTE — PATIENT INSTRUCTIONS
Due to the headache not responding to tylenol and history of hydrocephalus with a shunt placement and the wife reporting forgetfullness, I recommend the patient go to the hospital for further evaluation  Wife is babysitting and will have to find someone to watch the baby before taking him to the hospital   I recommend she call 911 if any symptoms worsen

## 2022-03-28 ENCOUNTER — TRANSITIONAL CARE MANAGEMENT (OUTPATIENT)
Dept: INTERNAL MEDICINE CLINIC | Facility: OTHER | Age: 62
End: 2022-03-28

## 2022-04-01 NOTE — PROGRESS NOTES
Called and spoke with patient  He reports he is feeling better at this time  He has shunt replaced while inpatient and reports he is feeling much better  [facility]  Feeling better since hospital   HA improved  Keep scheduled follow up and report back sooner if any change in sx

## 2022-04-08 ENCOUNTER — OFFICE VISIT (OUTPATIENT)
Dept: INTERNAL MEDICINE CLINIC | Age: 62
End: 2022-04-08
Payer: COMMERCIAL

## 2022-04-08 VITALS
SYSTOLIC BLOOD PRESSURE: 122 MMHG | WEIGHT: 218.5 LBS | HEART RATE: 74 BPM | DIASTOLIC BLOOD PRESSURE: 76 MMHG | OXYGEN SATURATION: 99 % | TEMPERATURE: 97.9 F | HEIGHT: 72 IN | BODY MASS INDEX: 29.59 KG/M2

## 2022-04-08 DIAGNOSIS — R41.82 ALTERED MENTAL STATUS, UNSPECIFIED ALTERED MENTAL STATUS TYPE: ICD-10-CM

## 2022-04-08 DIAGNOSIS — G91.9 HYDROCEPHALUS, UNSPECIFIED TYPE (HCC): ICD-10-CM

## 2022-04-08 DIAGNOSIS — T85.618S SHUNT MALFUNCTION, SEQUELA: ICD-10-CM

## 2022-04-08 DIAGNOSIS — I10 ESSENTIAL HYPERTENSION: Primary | ICD-10-CM

## 2022-04-08 DIAGNOSIS — D56.9 THALASSEMIA, UNSPECIFIED TYPE: ICD-10-CM

## 2022-04-08 DIAGNOSIS — Z76.89 ENCOUNTER FOR SUPPORT AND COORDINATION OF TRANSITION OF CARE: ICD-10-CM

## 2022-04-08 PROBLEM — T85.618A SHUNT MALFUNCTION: Status: ACTIVE | Noted: 2022-04-08

## 2022-04-08 PROCEDURE — 3008F BODY MASS INDEX DOCD: CPT | Performed by: PHYSICIAN ASSISTANT

## 2022-04-08 PROCEDURE — 99495 TRANSJ CARE MGMT MOD F2F 14D: CPT | Performed by: PHYSICIAN ASSISTANT

## 2022-04-08 RX ORDER — OXYCODONE HYDROCHLORIDE 5 MG/1
TABLET ORAL
COMMUNITY
Start: 2022-03-27 | End: 2022-04-08 | Stop reason: ALTCHOICE

## 2022-04-08 RX ORDER — PSEUDOEPHED/ACETAMINOPH/DIPHEN 30MG-500MG
TABLET ORAL
COMMUNITY
Start: 2022-03-27 | End: 2022-04-08 | Stop reason: ALTCHOICE

## 2022-04-08 NOTE — ASSESSMENT & PLAN NOTE
Hospital discharge summary from [facility] reviewed  Patient doing very well since his hospitalization  He had a follow-up this past week with his neurosurgeon and has follow-up CT scan of his head ordered  Patient underwent a revision and replacement of his  shunt 3/25/2022 and discharged 3/27/2022  Since shunt replaced patient doing much better  No longer with any confusion, dizziness, sluggishness  No residual weakness or focal deficits on exam   Advised patient to continue to follow with his neurosurgeon and keep our office informed of any change in treatment plan  Go for follow-up CT of head as already ordered by neuro surgery  Shunt placement site on abdomen appears to be healing well with no signs of infection continue to monitor and report back if any change  No medication changes at this time  Blood pressure well controlled at this time  Patient no longer taking Tylenol or oxycodone for pain these medications were removed from his med list   Keep follow-up next month as scheduled

## 2022-04-08 NOTE — PROGRESS NOTES
[facility]  Transition of Care Visit  Patient ID: Ginny Chong    : 1960  Age/Gender: 63 y o  male     DATE: 2022      Assessment/Plan:    Encounter for support and coordination of transition of care  Hospital discharge summary from [facility] reviewed  Patient doing very well since his hospitalization  He had a follow-up this past week with his neurosurgeon and has follow-up CT scan of his head ordered  Patient underwent a revision and replacement of his  shunt 3/25/2022 and discharged 3/27/2022  Since shunt replaced patient doing much better  No longer with any confusion, dizziness, sluggishness  No residual weakness or focal deficits on exam   Advised patient to continue to follow with his neurosurgeon and keep our office informed of any change in treatment plan  Go for follow-up CT of head as already ordered by neuro surgery  Shunt placement site on abdomen appears to be healing well with no signs of infection continue to monitor and report back if any change  No medication changes at this time  Blood pressure well controlled at this time  Patient no longer taking Tylenol or oxycodone for pain these medications were removed from his med list   Keep follow-up next month as scheduled  Essential hypertension  Blood pressure controlled at this time on lisinopril 20 mg daily  Thalassemia  CBC recently checked while inpatient  Stable    Will continue to follow       Diagnoses and all orders for this visit:    Essential hypertension    Encounter for support and coordination of transition of care    Shunt malfunction, sequela    Hydrocephalus, unspecified type ([facility] [address] )    Thalassemia, unspecified type    Altered mental status, unspecified altered mental status type    Other orders  -     Discontinue: Acetaminophen Extra Strength 500 MG tablet; PLEASE SEE ATTACHED FOR DETAILED DIRECTIONS (Patient not taking: Reported on 4/8/2022)  -     Discontinue: oxyCODONE (ROXICODONE) 5 immediate release tablet; PLEASE SEE ATTACHED FOR DETAILED DIRECTIONS (Patient not taking: Reported on 4/8/2022)          Subjective:       Sai Conteh is a 63 y o  male who is here for TCM follow up on 4/8/2022  During the TCM phone call the patient stated:    TCM Call (since 3/8/2022)     Date and time call was made  3/28/2022 10:51 AM    Hospital care reviewed  Records reviewed        Patient was hospitialized at  [facility]        Date of Admission  03/23/22    Date of discharge  03/27/22    Diagnosis  shunt malfunction, hydrocephalus, altered mental status    Disposition  Home    Current Symptoms  None      TCM Call (since 3/8/2022)     Post hospital issues  None    Scheduled for follow up? Yes    Did you obtain your prescribed medications  Yes    I have advised the patient to call PCP with any new or worsening symptoms  Patricia Bae TREASURE          53-year-old male with a history of hydrocephalus, hypertension, dyslipidemia presents to the office for transition of care visit  He was admitted to the hospital 3/23/2022 with acute hydrocephalus  Patient reports that prior to the hospital he had been doing relatively well  He had a office follow-up on 03/18 and was not having any difficulties  He reports that on 03/19 he played soccer and may have had a soccer ball  He feels he may have "overdone it a bit" playing soccer but this is nothing out of his normal daily physical activity  He tried to rest on 03/20 because he was feeling fatigued and was having headache  He reports that his symptoms progressively worsened and he needed to call out of work on 3/21/2022  He does not recall much after 3/21/2022  He recalls that his wife told him that she tried to allow him to rest at home but he appeared to be worsening and was not acting like himself    He was resistant but then willing to go to the hospital   He was admitted to the hospital on 3/23/2022 and was found to have a malfunctioning  shunt with hydrocephalus  He was admitted to the hospital and had a revision and replacement of his  shunt on 3/25/2022  He was followed by physical therapy as well as neuro surgery  Patient reports that he saw significant improvement of his symptoms following the shunt placement  His previous shunt had to be removed and since it had been placed originally back in 0 year reports there was a large degree of scar tissue  Following his shunt procedure his confusion and lethargy improved  His imbalance improved  He was discharged home on 3/27/2022  He reports that he did see his surgeon this past week for his hospital follow-up  He was told by surgeon that things are healing well  He was told to remain out of work until his subsequent Neurosurgery follow-up  He does have a follow-up CT of his head scheduled to monitor the previously noted hydrocephalus  He had his staples removed on his abdomen and his last surgical visit  He has not had any bleeding or drainage from his abdominal surgical site  Abdominal pain improving since hospital   Fatigue much improved  Nausea improving each day  Appetite improving  HA resolved  No incontinance, notes his balance is "good "  He notes that he does have some decreased exercise tolerance since the hospital but feels his strength and exercise tolerance is better today than when he left the hospital   No current complaints or concerns at this time  Feeling better than prior hospital   Has good support structure at home  He is not currently following with outpatient physical therapy and does not feel like he needs his service at this time  Feels his strength is improved      Neurologic Problem  The patient's pertinent negatives include no altered mental status, clumsiness, focal sensory loss, focal weakness, loss of balance, memory loss (improved), near-syncope, slurred speech, syncope, visual change or weakness  The problem has been resolved since onset  There was no focality noted  Associated symptoms include fatigue (improving since hospital) and nausea (occasional nausea without vomit)  Pertinent negatives include no abdominal pain, auditory change, back pain, bladder incontinence, bowel incontinence, chest pain, confusion, dizziness, fever, headaches (resolved), light-headedness, neck pain, palpitations, shortness of breath or vomiting  Past treatments include nothing  There is no history of a bleeding disorder, a clotting disorder or head trauma  The following portions of the patient's history were reviewed and updated as appropriate: allergies, current medications, past family history, past medical history, past social history, past surgical history and problem list     Review of Systems   Constitutional: Positive for fatigue (improving since hospital)  Negative for fever  Respiratory: Negative for cough, shortness of breath and wheezing  Not having any cough or congestion since hospitalization   Cardiovascular: Negative for chest pain, palpitations, leg swelling (Denies any calf pain) and near-syncope  Gastrointestinal: Positive for nausea (occasional nausea without vomit)  Negative for abdominal pain, bowel incontinence, diarrhea and vomiting  Genitourinary: Negative for bladder incontinence and difficulty urinating  Musculoskeletal: Negative for back pain and neck pain  Skin: Negative for rash  Surgical site without any redness bleeding or drainage   Neurological: Negative for dizziness, focal weakness, syncope, weakness, light-headedness, headaches (resolved) and loss of balance  Psychiatric/Behavioral: Negative for confusion and memory loss (improved)           Patient Active Problem List   Diagnosis    Essential hypertension    Gastroesophageal reflux disease without esophagitis    High serum high density lipoprotein (HDL)    Hypopigmentation    Seasonal allergies  Thalassemia    Obesity (BMI 30 0-34  9)    Need for vaccination for Strep pneumoniae    Low density lipoprotein (LDL) cholesterol greater than 129 mg/dL    Vaccine counseling    Other dysphagia    Encounter for support and coordination of transition of care    Hydrocephalus ([address] )    Shunt malfunction       Past Medical History:   Diagnosis Date    Allergic     Allergic rhinitis     Altered mental status 4/8/2022    GERD (gastroesophageal reflux disease)     Hydrocephalus (HCC)     Hypertension     Obesity (BMI 30 0-34  9) 4/11/2018    Thalassemia 10/25/2017       Past Surgical History:   Procedure Laterality Date    [place] COLONOSCOPY FLX DX W/COLLJ SPEC WHEN PFRMD N/A 2/8/2017    Procedure: COLONOSCOPY;  Surgeon: Lucas Allen MD;  Location: [facility];   Service: Gastroenterology    SHOULDER SURGERY      last assessed 3/8/2016    VENTRICULAR ATRIAL SHUNT      for hydrocephalus    VENTRICULOPERITONEAL SHUNT      VENTRICULOPERITONEAL SHUNT Right 03/25/2022    Revision and replacement of ventriculoperitoneal shunt         Current Outpatient Medications:     Diclofenac Sodium (VOLTAREN) 1 %, Apply 2 g topically 3 (three) times a day as needed (thumb pain), Disp: 50 g, Rfl: 1    lisinopril (ZESTRIL) 20 mg tablet, Take 1 tablet (20 mg total) by mouth daily, Disp: 90 tablet, Rfl: 1    Omega-3 Fatty Acids (fish oil) 1,000 mg, Take 1 capsule (1,000 mg total) by mouth daily, Disp: 90 capsule, Rfl: 1    omeprazole (PriLOSEC) 20 mg delayed release capsule, Take 1 capsule (20 mg total) by mouth daily, Disp: 30 capsule, Rfl: 2    Allergies   Allergen Reactions    Pollen Extract        Social History     Socioeconomic History    Marital status: /Civil Union     Spouse name: None    Number of children: None    Years of education: None    Highest education level: None   Occupational History    None   Tobacco Use    Smoking status: Never Smoker    Smokeless tobacco: Never Used   Vaping Use    Vaping Use: Never used   Substance and Sexual Activity    Alcohol use: Yes     Comment: rare occassion    Drug use: No    Sexual activity: Yes   Other Topics Concern    None   Social History Narrative    Activities:  Soccer     Social Determinants of Health     Financial Resource Strain: Not on file   Food Insecurity: Not on file   Transportation Needs: Not on file   Physical Activity: Not on file   Stress: Not on file   Social Connections: Not on file   Intimate Partner Violence: Not on file   Housing Stability: Not on file       Family History   Problem Relation Age of Onset    Diabetes Mother         DM    Diabetes Father         DM    Hypertension Father     Diabetes Brother         DM    Heart disease Brother         problem    Liver disease Family         sibling    Lung cancer Family         Uncle    Diabetes type II Brother     Cirrhosis Brother     Diabetes type II Brother     Dialysis Brother     Thrombocytopenia Daughter     Other Daughter         ITP    Other Other         TTP       PHQ-2/9 Depression Screening    Little interest or pleasure in doing things: 0 - not at all  Feeling down, depressed, or hopeless: 0 - not at all  PHQ-2 Score: 0  PHQ-2 Interpretation: Negative depression screen         Health Maintenance   Topic Date Due    COVID-19 Vaccine (1) Never done    Pneumococcal Vaccine: Pediatrics (0 to 5 Years) and At-Risk Patients (6 to 59 Years) (1 of 4 - PCV13) Never done    HIV Screening  Never done    Annual Physical  Never done    DTaP,Tdap,and Td Vaccines (1 - Tdap) Never done    Influenza Vaccine (1) Never done    Colorectal Cancer Screening  02/08/2022    BMI: Followup Plan  03/18/2023    Depression Screening  04/08/2023    BMI: Adult  04/08/2023    Hepatitis C Screening  Completed    HIB Vaccine  Aged Out    Hepatitis B Vaccine  Aged Out    IPV Vaccine  Aged Out    Hepatitis A Vaccine  Aged Out    Meningococcal ACWY Vaccine  Aged Out    HPV Vaccine Aged Out       There is no immunization history for the selected administration types on file for this patient  Objective:  Vitals:    04/08/22 1026   BP: 122/76   BP Location: Left arm   Patient Position: Sitting   Cuff Size: Adult   Pulse: 74   Temp: 97 9 °F (36 6 °C)   TempSrc: Temporal   SpO2: 99%   Weight: 99 1 kg (218 lb 8 oz)   Height: 5' 11 5" (1 816 m)     Wt Readings from Last 3 Encounters:   04/08/22 99 1 kg (218 lb 8 oz)   03/18/22 98 4 kg (217 lb)   11/12/21 102 kg (225 lb)     Body mass index is 30 05 kg/m²  No exam data present       Physical Exam  Vitals and nursing note reviewed  Constitutional:       General: He is not in acute distress  Appearance: Normal appearance  He is well-developed  He is not diaphoretic  Comments: Alert pleasant cooperative  Seated in room in no acute distress   HENT:      Head: Normocephalic and atraumatic  Right Ear: Tympanic membrane normal       Left Ear: Tympanic membrane normal       Nose: Nose normal       Mouth/Throat:      Mouth: Mucous membranes are moist       Pharynx: No oropharyngeal exudate or posterior oropharyngeal erythema  Comments: No erythema or exudates  Moist mucous membranes  Airway patent  Eyes:      General: No scleral icterus  Right eye: No discharge  Left eye: No discharge  Pupils: Pupils are equal, round, and reactive to light  Cardiovascular:      Rate and Rhythm: Normal rate and regular rhythm  Heart sounds: Normal heart sounds  No murmur heard  Pulmonary:      Effort: Pulmonary effort is normal  No respiratory distress  Breath sounds: Normal breath sounds  No wheezing or rales  Comments: Clear to auscultation throughout bilaterally  No crackles no rhonchi no wheeze  No respiratory distress  Abdominal:      General: Abdomen is flat  Bowel sounds are normal  There is no distension  Palpations: Abdomen is soft  Tenderness: There is no abdominal tenderness   There is no right CVA tenderness, left CVA tenderness or guarding  Comments: Positive bowel sounds soft, mild tenderness to palpation near surgical site on right side of abdomen  Musculoskeletal:      Cervical back: Neck supple  Right lower leg: No edema  Left lower leg: No edema  Lymphadenopathy:      Cervical: No cervical adenopathy  Skin:     General: Skin is warm and dry  Findings: No rash  Neurological:      General: No focal deficit present  Mental Status: He is alert and oriented to person, place, and time  GCS: GCS eye subscore is 4  GCS verbal subscore is 5  GCS motor subscore is 6  Cranial Nerves: No cranial nerve deficit, dysarthria or facial asymmetry  Motor: No weakness  Coordination: Romberg sign negative  Coordination normal  Finger-Nose-Finger Test and Heel to [facility] Test normal  Rapid alternating movements normal       Gait: Gait and tandem walk normal       Deep Tendon Reflexes: Babinski sign absent on the right side  Babinski sign absent on the left side  Reflex Scores:       Bicep reflexes are 2+ on the right side and 2+ on the left side  Patellar reflexes are 2+ on the right side and 2+ on the left side  Achilles reflexes are 2+ on the right side and 2+ on the left side  Comments: Symmetric upper and lower extremity strength  5/5 bilaterally   Psychiatric:         Behavior: Behavior normal          Thought Content:  Thought content normal              Future Appointments   Date Time Provider [facility] [place]   5/17/2022  1:40 PM Kashmir Caraballo MD GASTRO ALL Med [facility]   9/2/2022  1:00 PM Eliana Kenyon PA-C [address] [place] [address]    Patient Care Team:  Eliana Kenyon PA-C as PCP - General (Internal Medicine)  MD Kashmir Daniel MD as Endoscopist

## 2022-04-08 NOTE — PATIENT INSTRUCTIONS
Encounter for support and coordination of transition of care  Hospital discharge summary from [facility] reviewed  Patient doing very well since his hospitalization  He had a follow-up this past week with his neurosurgeon and has follow-up CT scan of his head ordered  Patient underwent a revision and replacement of his  shunt 3/25/2022 and discharged 3/27/2022  Since shunt replaced patient doing much better  No longer with any confusion, dizziness, sluggishness  No residual weakness or focal deficits on exam   Advised patient to continue to follow with his neurosurgeon and keep our office informed of any change in treatment plan  Go for follow-up CT of head as already ordered by neuro surgery  Shunt placement site on abdomen appears to be healing well with no signs of infection continue to monitor and report back if any change  No medication changes at this time  Blood pressure well controlled at this time  Patient no longer taking Tylenol or oxycodone for pain these medications were removed from his med list   Keep follow-up next month as scheduled  Essential hypertension  Blood pressure controlled at this time on lisinopril 20 mg daily  Thalassemia  CBC recently checked while inpatient  Stable    Will continue to follow

## 2022-05-16 DIAGNOSIS — I10 ESSENTIAL HYPERTENSION: ICD-10-CM

## 2022-05-16 RX ORDER — LISINOPRIL 20 MG/1
20 TABLET ORAL DAILY
Qty: 90 TABLET | Refills: 1 | Status: SHIPPED | OUTPATIENT
Start: 2022-05-16

## 2022-05-17 ENCOUNTER — CONSULT (OUTPATIENT)
Dept: GASTROENTEROLOGY | Facility: MEDICAL CENTER | Age: 62
End: 2022-05-17
Payer: COMMERCIAL

## 2022-05-17 VITALS
TEMPERATURE: 96.5 F | BODY MASS INDEX: 31.05 KG/M2 | WEIGHT: 225.8 LBS | SYSTOLIC BLOOD PRESSURE: 138 MMHG | DIASTOLIC BLOOD PRESSURE: 87 MMHG | HEART RATE: 74 BPM

## 2022-05-17 DIAGNOSIS — Z12.11 COLON CANCER SCREENING: Primary | ICD-10-CM

## 2022-05-17 DIAGNOSIS — K21.9 GASTROESOPHAGEAL REFLUX DISEASE WITHOUT ESOPHAGITIS: ICD-10-CM

## 2022-05-17 PROCEDURE — 1036F TOBACCO NON-USER: CPT | Performed by: INTERNAL MEDICINE

## 2022-05-17 PROCEDURE — 99204 OFFICE O/P NEW MOD 45 MIN: CPT | Performed by: INTERNAL MEDICINE

## 2022-05-17 RX ORDER — POLYETHYLENE GLYCOL-3350 AND ELECTROLYTES 236; 6.74; 5.86; 2.97; 22.74 G/274.31G; G/274.31G; G/274.31G; G/274.31G; G/274.31G
4 POWDER, FOR SOLUTION ORAL ONCE
Qty: 4000 ML | Refills: 0 | Status: SHIPPED | OUTPATIENT
Start: 2022-05-17 | End: 2022-05-17

## 2022-05-17 NOTE — PROGRESS NOTES
[facility] - Outpatient Consultation  Dilip Logan 63 y o  male MRN: [id number]  Encounter: [id number]          ASSESSMENT AND PLAN:      1  Gastroesophageal reflux disease without esophagitis  Patient has new onset GERD, he was counseled on the importance of life style modification  Will start omeprazole 40 mg daily for symptom relief  Plan for EGD to rule out h  Pylori, evaluate for PUD disease  - EGD; Future    2  Colon cancer screening  Patient is due for colonoscopy  Schedule colonoscopy  - polyethylene glycol (GaviLyte-G) 4000 mL solution; Take 4,000 mL by mouth once for 1 dose  Dispense: 4000 mL; Refill: 0  - bisacodyl (DULCOLAX) 5 mg EC tablet; Take 2 tablets (10 mg total) by mouth once for 1 dose  Dispense: 2 tablet; Refill: 0  - Colonoscopy; Future    ______________________________________________________________________    HPI:    57-year-old man presented for evaluation of acid reflux and colon cancer screening  Patient was seen by us 5 years ago for colon cancer screening, it was recommended for him to repeat colonoscopy in 5 years  He reported regular formed bowel movement  Denies family history of colon cancer  Patient reported recent onset of acid reflux in the past 3 months  He reported his symptoms it gets worse after he drink coffee  He has burning sensation in the chest and epigastric area  He is an immigrant from [place] and 30 years ago, he denies dysphagia or weight loss or nausea or vomiting  He denies family history of gastric cancer  REVIEW OF SYSTEMS:    CONSTITUTIONAL: Denies any fever, chills, rigors, and weight loss  HEENT: No earache or tinnitus  Denies hearing loss or visual disturbances  CARDIOVASCULAR: No chest pain or palpitations  RESPIRATORY: Denies any cough, hemoptysis, shortness of breath or dyspnea on exertion  GASTROINTESTINAL: As noted in the History of Present Illness  GENITOURINARY: No problems with urination   Denies any hematuria or dysuria  NEUROLOGIC: No dizziness or vertigo, denies headaches  MUSCULOSKELETAL: Denies any muscle or joint pain  SKIN: Denies skin rashes or itching  ENDOCRINE: Denies excessive thirst  Denies intolerance to heat or cold  PSYCHOSOCIAL: Denies depression or anxiety  Denies any recent memory loss  Historical Information   Past Medical History:   Diagnosis Date    Allergic     Allergic rhinitis     Altered mental status 4/8/2022    GERD (gastroesophageal reflux disease)     Hydrocephalus (HCC)     Hypertension     Obesity (BMI 30 0-34  9) 4/11/2018    Thalassemia 10/25/2017     Past Surgical History:   Procedure Laterality Date    [place] COLONOSCOPY FLX DX W/COLLJ SPEC WHEN PFRMD N/A 2/8/2017    Procedure: COLONOSCOPY;  Surgeon: Meño Guy MD;  Location: [facility] GI LAB;   Service: Gastroenterology    SHOULDER SURGERY      last assessed 3/8/2016    VENTRICULAR ATRIAL SHUNT      for hydrocephalus    VENTRICULOPERITONEAL SHUNT      VENTRICULOPERITONEAL SHUNT Right 03/25/2022    Revision and replacement of ventriculoperitoneal shunt     Social History   Social History     Substance and Sexual Activity   Alcohol Use Yes    Comment: rare occassion     Social History     Substance and Sexual Activity   Drug Use No     Social History     Tobacco Use   Smoking Status Never Smoker   Smokeless Tobacco Never Used     Family History   Problem Relation Age of Onset    Diabetes Mother         DM    Diabetes Father         DM    Hypertension Father     Diabetes Brother         DM    Heart disease Brother         problem    Liver disease Family         sibling    Lung cancer Family         Uncle    Diabetes type II Brother     Cirrhosis Brother     Diabetes type II Brother     Dialysis Brother     Thrombocytopenia Daughter     Other Daughter         ITP    Other Other         TTP       Meds/Allergies       Current Outpatient Medications:     bisacodyl (DULCOLAX) 5 mg EC tablet   lisinopril (ZESTRIL) 20 mg tablet    Omega-3 Fatty Acids (fish oil) 1,000 mg    omeprazole (PriLOSEC) 20 mg delayed release capsule    polyethylene glycol (GaviLyte-G) 4000 mL solution    Diclofenac Sodium (VOLTAREN) 1 %    Allergies   Allergen Reactions    Pollen Extract            Objective     Blood pressure 138/87, pulse 74, temperature (!) 96 5 °F (35 8 °C), temperature source Probe, weight 102 kg (225 lb 12 8 oz)  Body mass index is 31 05 kg/m²  PHYSICAL EXAM:      General Appearance:   Alert, cooperative, no distress   HEENT:   Normocephalic, atraumatic, anicteric      Neck:  Supple, symmetrical, trachea midline   Lungs:   Clear to auscultation bilaterally; no rales, rhonchi or wheezing; respirations unlabored    Heart[de-identified]   Regular rate and rhythm; no murmur, rub, or gallop  Abdomen:   Soft, non-tender, non-distended; normal bowel sounds; no masses, no organomegaly    Genitalia:   Deferred    Rectal:   Deferred    Extremities:  No cyanosis, clubbing or edema    Pulses:  2+ and symmetric    Skin:  No jaundice, rashes, or lesions    Lymph nodes:  No palpable cervical lymphadenopathy        Lab Results:   No visits with results within 1 Day(s) from this visit     Latest known visit with results is:   Appointment on 05/01/2019   Component Date Value    WBC 05/01/2019 4 32     RBC 05/01/2019 6 86 (A)    Hemoglobin 05/01/2019 14 5     Hematocrit 05/01/2019 47 1     MCV 05/01/2019 69 (A)    MCH 05/01/2019 21 1 (A)    MCHC 05/01/2019 30 8 (A)    RDW 05/01/2019 18 2 (A)    Platelets 32/02/6019 163     nRBC 05/01/2019 0     Neutrophils Relative 05/01/2019 44     Immat GRANS % 05/01/2019 1     Lymphocytes Relative 05/01/2019 38     Monocytes Relative 05/01/2019 13 (A)    Eosinophils Relative 05/01/2019 3     Basophils Relative 05/01/2019 1     Neutrophils Absolute 05/01/2019 1 96     Immature Grans Absolute 05/01/2019 0 02     Lymphocytes Absolute 05/01/2019 1 65     Monocytes Absolute 05/01/2019 0 54     Eosinophils Absolute 05/01/2019 0 13     Basophils Absolute 05/01/2019 0 02     Sodium 05/01/2019 135 (A)    Potassium 05/01/2019 4 4     Chloride 05/01/2019 102     CO2 05/01/2019 29     ANION GAP 05/01/2019 4     BUN 05/01/2019 20     Creatinine 05/01/2019 1 06     Glucose, Fasting 05/01/2019 93     Calcium 05/01/2019 9 1     AST 05/01/2019 26     ALT 05/01/2019 50     Alkaline Phosphatase 05/01/2019 47     Total Protein 05/01/2019 7 5     Albumin 05/01/2019 4 1     Total Bilirubin 05/01/2019 0 74     eGFR 05/01/2019 78     Cholesterol 05/01/2019 238 (A)    Triglycerides 05/01/2019 96     HDL, Direct 05/01/2019 78 (A)    LDL Calculated 05/01/2019 141 (A)         Radiology Results:   No results found

## 2022-05-17 NOTE — PATIENT INSTRUCTIONS
Scheduled date of colon/egd (as of today) 8/18/22  Physician performing Dr Kerry Nicole  Location of procedure  [facility]  Bowel prep reviewed with patient: Doug/dulcolax  Instructions reviewed with patient by: ma  Clearances: na

## 2022-05-20 ENCOUNTER — TELEPHONE (OUTPATIENT)
Dept: GASTROENTEROLOGY | Facility: MEDICAL CENTER | Age: 62
End: 2022-05-20

## 2022-05-20 DIAGNOSIS — K21.9 GASTROESOPHAGEAL REFLUX DISEASE WITHOUT ESOPHAGITIS: Primary | ICD-10-CM

## 2022-05-20 RX ORDER — OMEPRAZOLE 40 MG/1
40 CAPSULE, DELAYED RELEASE ORAL DAILY
Qty: 30 CAPSULE | Refills: 2 | Status: SHIPPED | OUTPATIENT
Start: 2022-05-20 | End: 2022-05-24

## 2022-05-20 NOTE — TELEPHONE ENCOUNTER
----- Message from Timmy Ayon MD sent at 5/20/2022 10:04 AM EDT -----  I realized he is not scheduled to have EGD till August, I prescribed omeprazole 40 mg daily for him, please let him know and let me know if he has any questions

## 2022-05-24 RX ORDER — OMEPRAZOLE 40 MG/1
40 CAPSULE, DELAYED RELEASE ORAL DAILY
Qty: 30 CAPSULE | Refills: 3 | Status: SHIPPED | OUTPATIENT
Start: 2022-05-24 | End: 2022-06-23

## 2022-08-09 ENCOUNTER — TELEPHONE (OUTPATIENT)
Dept: GASTROENTEROLOGY | Facility: MEDICAL CENTER | Age: 62
End: 2022-08-09

## 2022-08-16 NOTE — TELEPHONE ENCOUNTER
Patient has been rescheduled to 9/28/22 with Dr Angelique Mart at the [facility] end endo lab   Patient is aware to follow the same prep instructions and that he will receive a call the day prior with his arrival time for his colon/EGD

## 2022-09-02 ENCOUNTER — OFFICE VISIT (OUTPATIENT)
Dept: INTERNAL MEDICINE CLINIC | Age: 62
End: 2022-09-02
Payer: COMMERCIAL

## 2022-09-02 VITALS
TEMPERATURE: 97.6 F | WEIGHT: 224.8 LBS | HEART RATE: 64 BPM | HEIGHT: 72 IN | DIASTOLIC BLOOD PRESSURE: 86 MMHG | BODY MASS INDEX: 30.45 KG/M2 | OXYGEN SATURATION: 97 % | SYSTOLIC BLOOD PRESSURE: 134 MMHG

## 2022-09-02 DIAGNOSIS — D56.9 THALASSEMIA, UNSPECIFIED TYPE: ICD-10-CM

## 2022-09-02 DIAGNOSIS — E78.00 LOW DENSITY LIPOPROTEIN (LDL) CHOLESTEROL GREATER THAN 129 MG/DL: ICD-10-CM

## 2022-09-02 DIAGNOSIS — I10 ESSENTIAL HYPERTENSION: ICD-10-CM

## 2022-09-02 DIAGNOSIS — Z71.85 VACCINE COUNSELING: ICD-10-CM

## 2022-09-02 DIAGNOSIS — G91.9 HYDROCEPHALUS, UNSPECIFIED TYPE (HCC): Primary | ICD-10-CM

## 2022-09-02 DIAGNOSIS — Z12.5 SCREENING PSA (PROSTATE SPECIFIC ANTIGEN): ICD-10-CM

## 2022-09-02 DIAGNOSIS — Z12.11 COLON CANCER SCREENING: ICD-10-CM

## 2022-09-02 DIAGNOSIS — Z11.4 SCREENING FOR HIV (HUMAN IMMUNODEFICIENCY VIRUS): ICD-10-CM

## 2022-09-02 PROBLEM — T85.618A SHUNT MALFUNCTION: Status: RESOLVED | Noted: 2022-04-08 | Resolved: 2022-09-02

## 2022-09-02 PROCEDURE — 3725F SCREEN DEPRESSION PERFORMED: CPT | Performed by: PHYSICIAN ASSISTANT

## 2022-09-02 PROCEDURE — 99214 OFFICE O/P EST MOD 30 MIN: CPT | Performed by: PHYSICIAN ASSISTANT

## 2022-09-02 RX ORDER — CHLORAL HYDRATE 500 MG
1000 CAPSULE ORAL DAILY
Qty: 90 CAPSULE | Refills: 1 | Status: SHIPPED | OUTPATIENT
Start: 2022-09-02

## 2022-09-02 RX ORDER — LISINOPRIL 30 MG/1
30 TABLET ORAL DAILY
Qty: 90 TABLET | Refills: 1 | Status: SHIPPED | OUTPATIENT
Start: 2022-09-02

## 2022-09-02 NOTE — ASSESSMENT & PLAN NOTE
Patient has been trying to modify his diet to help improve his cholesterol levels  Will repeat lipid panel with next lab set  Give encouragement for low-fat low-cholesterol diet  Continue fish oil

## 2022-09-02 NOTE — PROGRESS NOTES
[facility] [place]  Standard Office Visit  Patient ID: Randell Avila    : 1960  Age/Gender: 63 y o  male     DATE: 2022      Assessment/Plan:    Hydrocephalus ([facility], [place] [address] )  S/p shunt  Doing well  Keep neurosurgery follow up  Essential hypertension  Blood pressure today in the office as well as home blood pressure readings are slightly be on goal   Discussed blood pressure goal of less than 120 over less than 80  Increase lisinopril to 30 mg daily  Continue with low-salt diet and daily physical activity  Give encouragement with weight loss  Will continue to follow  Low density lipoprotein (LDL) cholesterol greater than 129 mg/dL  Patient has been trying to modify his diet to help improve his cholesterol levels  Will repeat lipid panel with next lab set  Give encouragement for low-fat low-cholesterol diet  Continue fish oil  Vaccine counseling  Discussed risks and benefits of vaccinations which patient is not interested in vaccines at this time  Thalassemia  Reviewed recent labs  Hemoglobin and hematocrit are stable  Platelet returned to normal   Will continue to follow  Patient not interested in seeing hematology which was recommended today  Diagnoses and all orders for this visit:    Hydrocephalus, unspecified type ([facility], [place] [address] )    Essential hypertension  -     lisinopril (ZESTRIL) 30 mg tablet; Take 1 tablet (30 mg total) by mouth daily  -     Comprehensive metabolic panel; Future  -     CBC and differential; Future    Low density lipoprotein (LDL) cholesterol greater than 129 mg/dL  -     Omega-3 Fatty Acids (fish oil) 1,000 mg; Take 1 capsule (1,000 mg total) by mouth daily  -     Comprehensive metabolic panel; Future  -     CBC and differential; Future  -     Lipid Panel with Direct LDL reflex;  Future    Vaccine counseling    Thalassemia, unspecified type  -     CBC and differential; Future    Screening for HIV (human immunodeficiency virus)  -     HIV 1/2 Antigen/Antibody (4th Generation) w Reflex [facility]; Future    Screening PSA (prostate specific antigen)  Comments:  Discussed risks and bennefits of screenign  Patient verbalized understanding and is willing  Order placed today  Orders:  -     PSA, Total Screen; Future    Colon cancer screening  Comments:  Patient has GI follow-up scheduled for upper and lower endoscopy          BMI Counseling: Body mass index is 30 92 kg/m²  The BMI is above normal  Nutrition recommendations include decreasing portion sizes, moderation in carbohydrate intake, increasing intake of lean protein, reducing intake of saturated and trans fat and reducing intake of cholesterol  Exercise recommendations include exercising 3-5 times per week  No pharmacotherapy was ordered  Patient referred to PCP  Rationale for BMI follow-up plan is due to patient being overweight or obese  Depression Screening and Follow-up Plan: Clincally patient does not have depression  No treatment is required  Subjective:   Chief Complaint   Patient presents with    Follow-up     HTN  Review labs          Sonia Sandoval is a 63 y o  male who presents to the office on 9/2/2022 for     64 yo male with a history of hypertension, hydrocephalus status post shunting  Elevated cholesterol presents to the office for follow up  Follows with neurosurgery every 6 months  Has upcoming appointment to follow up on  shunt in October  Since shunt placement patient notes that "everything has been good"   He notes he worked all night and did not sleep yet today  He took all of his medications as directed  BP med next due at 1800 hours  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 (127/80)  Does regular exercise walking with his wife  Notes he is waiting until after his 10/2022 checkup with neurosurgery prior to restarting soccer and has been limiting his exercise to regular walks     Plans to retire in 1 year which he is looking forward toy  He checks his BP at home and has good control < 120-130/ <75-85  Never consistently under 120/80  Saw GI and was to get upper and lower endoscopy but had to reschedule due to his brother passing away  He feels he is coping with the loss  Since starting Prilosec GI sx much improved  He notes he is exercising and trying to be more healthy with his wife  Wife lost 50 lbs and is doing great  Reports that he is not interested in any vaccinations at this time      The following portions of the patient's history were reviewed and updated as appropriate: allergies, current medications, past family history, past medical history, past social history, past surgical history and problem list     Review of Systems   Constitutional: Negative for chills and fever  HENT: Negative for rhinorrhea and sore throat  Respiratory: Negative for cough, shortness of breath and wheezing  Cardiovascular: Negative for chest pain and palpitations  Gastrointestinal: Negative for abdominal pain, diarrhea, nausea and vomiting  GERD fully resolved with prilosec  Doing much better since reducing coffee as well  Drinks less coffee  EGD scheduled  Genitourinary: Negative for dysuria and frequency  Skin: Negative for rash  Neurological: Negative for dizziness and numbness  Psychiatric/Behavioral: Negative for agitation  Patient Active Problem List   Diagnosis    Essential hypertension    Gastroesophageal reflux disease without esophagitis    High serum high density lipoprotein (HDL)    Hypopigmentation    Seasonal allergies    Thalassemia    Obesity (BMI 30 0-34  9)    Need for vaccination for Strep pneumoniae    Low density lipoprotein (LDL) cholesterol greater than 129 mg/dL    Vaccine counseling    Other dysphagia    Encounter for support and coordination of transition of care    Hydrocephalus [facility])       Past Medical History:   Diagnosis Date    Allergic     Allergic rhinitis     Altered mental status 4/8/2022    GERD (gastroesophageal reflux disease)     Hydrocephalus (HCC)     Hypertension     Obesity (BMI 30 0-34  9) 4/11/2018    Thalassemia 10/25/2017       Past Surgical History:   Procedure Laterality Date    [place] COLONOSCOPY FLX DX W/COLLJ SPEC WHEN PFRMD N/A 2/8/2017    Procedure: COLONOSCOPY;  Surgeon: Mell Estevez MD;  Location: [facility] GI LAB;   Service: Gastroenterology    SHOULDER SURGERY      last assessed 3/8/2016    VENTRICULAR ATRIAL SHUNT      for hydrocephalus    VENTRICULOPERITONEAL SHUNT      VENTRICULOPERITONEAL SHUNT Right 03/25/2022    Revision and replacement of ventriculoperitoneal shunt         Current Outpatient Medications:     lisinopril (ZESTRIL) 30 mg tablet, Take 1 tablet (30 mg total) by mouth daily, Disp: 90 tablet, Rfl: 1    Omega-3 Fatty Acids (fish oil) 1,000 mg, Take 1 capsule (1,000 mg total) by mouth daily, Disp: 90 capsule, Rfl: 1    omeprazole (PriLOSEC) 40 MG capsule, Take 1 capsule (40 mg total) by mouth in the morning , Disp: 30 capsule, Rfl: 3    bisacodyl (DULCOLAX) 5 mg EC tablet, Take 2 tablets (10 mg total) by mouth once for 1 dose (Patient not taking: Reported on 9/2/2022), Disp: 2 tablet, Rfl: 0    Diclofenac Sodium (VOLTAREN) 1 %, Apply 2 g topically 3 (three) times a day as needed (thumb pain) (Patient not taking: No sig reported), Disp: 50 g, Rfl: 1    polyethylene glycol (GaviLyte-G) 4000 mL solution, Take 4,000 mL by mouth once for 1 dose (Patient not taking: Reported on 9/2/2022), Disp: 4000 mL, Rfl: 0    Allergies   Allergen Reactions    Pollen Extract        Social History     Socioeconomic History    Marital status: /Civil Union     Spouse name: None    Number of children: None    Years of education: None    Highest education level: None   Occupational History    None   Tobacco Use    Smoking status: Never Smoker    Smokeless tobacco: Never Used   Vaping Use    Vaping Use: Never used Substance and Sexual Activity    Alcohol use: Yes     Comment: rare occassion    Drug use: No    Sexual activity: Yes   Other Topics Concern    None   Social History Narrative    Activities:  Soccer     Social Determinants of Health     Financial Resource Strain: Not on file   Food Insecurity: Not on file   Transportation Needs: Not on file   Physical Activity: Not on file   Stress: Not on file   Social Connections: Not on file   Intimate Partner Violence: Not on file   Housing Stability: Not on file       Family History   Problem Relation Age of Onset    Diabetes Mother         DM    Diabetes Father         DM    Hypertension Father     Diabetes Brother         DM    Heart disease Brother         problem    Liver disease Family         sibling    Lung cancer Family         Uncle    Diabetes type II Brother     Cirrhosis Brother     Diabetes type II Brother     Dialysis Brother     Thrombocytopenia Daughter     Other Daughter         ITP    Other Other         TTP       PHQ-2/9 Depression Screening    Little interest or pleasure in doing things: 0 - not at all  Feeling down, depressed, or hopeless: 0 - not at all  PHQ-2 Score: 0  PHQ-2 Interpretation: Negative depression screen         Health Maintenance   Topic Date Due    COVID-19 Vaccine (1) Never done    Pneumococcal Vaccine: Pediatrics (0 to 5 Years) and At-Risk Patients (6 to 59 Years) (1 - PCV) Never done    HIV Screening  Never done    Annual Physical  Never done    DTaP,Tdap,and Td Vaccines (1 - Tdap) Never done    Colorectal Cancer Screening  02/08/2022    Influenza Vaccine (1) 09/01/2022    BMI: Followup Plan  03/18/2023    Depression Screening  09/02/2023    BMI: Adult  09/02/2023    Hepatitis C Screening  Completed    HIB Vaccine  Aged Out    Hepatitis B Vaccine  Aged Out    IPV Vaccine  Aged Out    Hepatitis A Vaccine  Aged Out    Meningococcal ACWY Vaccine  Aged Out    HPV Vaccine  Aged Out       There is no immunization history for the selected administration types on file for this patient  Objective:  Vitals:    09/02/22 1302 09/02/22 1326   BP: 142/94 134/86   BP Location: Left arm Left arm   Patient Position: Sitting Sitting   Cuff Size: Standard    Pulse: 64    Temp: 97 6 °F (36 4 °C)    TempSrc: Temporal    SpO2: 97%    Weight: 102 kg (224 lb 12 8 oz)    Height: 5' 11 5" (1 816 m)      Wt Readings from Last 3 Encounters:   09/02/22 102 kg (224 lb 12 8 oz)   05/17/22 102 kg (225 lb 12 8 oz)   04/08/22 99 1 kg (218 lb 8 oz)     Body mass index is 30 92 kg/m²  No exam data present       Physical Exam  Vitals and nursing note reviewed  Constitutional:       General: He is not in acute distress  Appearance: Normal appearance  He is well-developed  He is not diaphoretic  Comments: Alert pleasant cooperative  Seated in room in no acute distress   HENT:      Head: Normocephalic and atraumatic  Right Ear: Tympanic membrane normal       Left Ear: Tympanic membrane normal       Mouth/Throat:      Mouth: Mucous membranes are moist       Pharynx: No oropharyngeal exudate or posterior oropharyngeal erythema  Eyes:      General: No scleral icterus  Right eye: No discharge  Left eye: No discharge  Pupils: Pupils are equal, round, and reactive to light  Cardiovascular:      Rate and Rhythm: Normal rate and regular rhythm  Heart sounds: Normal heart sounds  No murmur heard  Pulmonary:      Effort: Pulmonary effort is normal  No respiratory distress  Breath sounds: Normal breath sounds  No wheezing or rales  Comments: Clear to auscultation throughout bilaterally  No crackles no rhonchi no wheeze  No respiratory distress  Abdominal:      General: Bowel sounds are normal  There is no distension  Palpations: Abdomen is soft  Tenderness: There is no abdominal tenderness  There is no right CVA tenderness, left CVA tenderness or guarding        Comments: Positive bowel sounds soft nontender nondistended   Musculoskeletal:      Cervical back: Neck supple  Right lower leg: No edema  Left lower leg: No edema  Skin:     General: Skin is warm  Comments: Positive hypopigmented patches throughout extremities trunk and face   Neurological:      General: No focal deficit present  Mental Status: He is alert  GCS: GCS eye subscore is 4  GCS verbal subscore is 5  GCS motor subscore is 6  Cranial Nerves: No cranial nerve deficit, dysarthria or facial asymmetry  Motor: No weakness, tremor, atrophy, abnormal muscle tone, seizure activity or pronator drift  Coordination: Romberg sign negative  Coordination normal  Finger-Nose-Finger Test and Heel to Kate Carp Test normal  Rapid alternating movements normal       Gait: Gait and tandem walk normal       Deep Tendon Reflexes: Babinski sign absent on the right side  Babinski sign absent on the left side  Reflex Scores:       Bicep reflexes are 2+ on the right side and 2+ on the left side  Patellar reflexes are 2+ on the right side and 2+ on the left side  Achilles reflexes are 2+ on the right side and 2+ on the left side  Psychiatric:         Behavior: Behavior normal          Thought Content: Thought content normal              Future Appointments   Date Time Provider Chaparrita Valdez   9/28/2022  9:00 AM [facility] GI ROOM [facility] END   12/9/2022  1:30 PM Roegrs Wells PA-C [address]    Patient Care Team:  Rogers Wells PA-C as PCP - General (Internal Medicine)  MD Alize Perez MD as Endoscopist    This note was completed in part utilizing [employer] Software    Grammatical errors, random word insertions, spelling mistakes, and incomplete sentences can be an occasional consequence of this system secondary to software limitations, ambient noise, and hardware issues  If you have any questions or concerns about the content, text, or information contained within the body of this dictation, please contact the provider for clarification

## 2022-09-02 NOTE — ASSESSMENT & PLAN NOTE
Reviewed recent labs  Hemoglobin and hematocrit are stable  Platelet returned to normal   Will continue to follow  Patient not interested in seeing hematology which was recommended today

## 2022-09-02 NOTE — ASSESSMENT & PLAN NOTE
Discussed risks and benefits of vaccinations which patient is not interested in vaccines at this time

## 2022-09-02 NOTE — PATIENT INSTRUCTIONS
Hydrocephalus (HCC)  S/p shunt  Doing well  Keep neurosurgery follow up  Essential hypertension  Blood pressure today in the office as well as home blood pressure readings are slightly be on goal   Discussed blood pressure goal of less than 120 over less than 80  Increase lisinopril to 30 mg daily  Continue with low-salt diet and daily physical activity  Give encouragement with weight loss  Will continue to follow  Low density lipoprotein (LDL) cholesterol greater than 129 mg/dL  Patient has been trying to modify his diet to help improve his cholesterol levels  Will repeat lipid panel with next lab set  Give encouragement for low-fat low-cholesterol diet  Continue fish oil  Vaccine counseling  Discussed risks and benefits of vaccinations which patient is not interested in vaccines at this time  Thalassemia  Reviewed recent labs  Hemoglobin and hematocrit are stable  Platelet returned to normal   Will continue to follow  Patient not interested in seeing hematology which was recommended today

## 2022-09-02 NOTE — ASSESSMENT & PLAN NOTE
Blood pressure today in the office as well as home blood pressure readings are slightly be on goal   Discussed blood pressure goal of less than 120 over less than 80  Increase lisinopril to 30 mg daily  Continue with low-salt diet and daily physical activity  Give encouragement with weight loss  Will continue to follow

## 2022-09-27 ENCOUNTER — ANESTHESIA EVENT (OUTPATIENT)
Dept: ANESTHESIOLOGY | Facility: HOSPITAL | Age: 62
End: 2022-09-27

## 2022-09-27 ENCOUNTER — ANESTHESIA (OUTPATIENT)
Dept: ANESTHESIOLOGY | Facility: HOSPITAL | Age: 62
End: 2022-09-27

## 2022-09-27 DIAGNOSIS — K21.9 GASTROESOPHAGEAL REFLUX DISEASE WITHOUT ESOPHAGITIS: ICD-10-CM

## 2022-09-27 RX ORDER — OMEPRAZOLE 40 MG/1
40 CAPSULE, DELAYED RELEASE ORAL DAILY
Qty: 30 CAPSULE | Refills: 3 | Status: SHIPPED | OUTPATIENT
Start: 2022-09-27 | End: 2022-10-24

## 2022-09-27 RX ORDER — SODIUM CHLORIDE 9 MG/ML
125 INJECTION, SOLUTION INTRAVENOUS CONTINUOUS
Status: CANCELLED | OUTPATIENT
Start: 2022-09-27

## 2022-09-27 NOTE — ANESTHESIA PREPROCEDURE EVALUATION
Procedure:  PRE-OP ONLY    Relevant Problems   ANESTHESIA (within normal limits)      CARDIO   (+) Essential hypertension      ENDO (within normal limits)      GI/HEPATIC   (+) Gastroesophageal reflux disease without esophagitis   (+) Other dysphagia      /RENAL (within normal limits)      GYN (within normal limits)      HEMATOLOGY   (+) Thalassemia      MUSCULOSKELETAL (within normal limits)      NEURO/PSYCH (within normal limits)      PULMONARY (within normal limits)             Anesthesia Plan  ASA Score- 2     Anesthesia Type- IV sedation with anesthesia with ASA Monitors  Additional Monitors:   Airway Plan:           Plan Factors-Exercise tolerance (METS): >4 METS  Chart reviewed  Patient summary reviewed  Patient is not a current smoker  Induction- intravenous  Postoperative Plan-     Informed Consent- Anesthetic plan and risks discussed with patient

## 2022-09-28 ENCOUNTER — ANESTHESIA EVENT (OUTPATIENT)
Dept: GASTROENTEROLOGY | Facility: MEDICAL CENTER | Age: 62
End: 2022-09-28

## 2022-09-28 ENCOUNTER — ANESTHESIA (OUTPATIENT)
Dept: GASTROENTEROLOGY | Facility: MEDICAL CENTER | Age: 62
End: 2022-09-28

## 2022-09-28 ENCOUNTER — HOSPITAL ENCOUNTER (OUTPATIENT)
Dept: GASTROENTEROLOGY | Facility: MEDICAL CENTER | Age: 62
Setting detail: OUTPATIENT SURGERY
Discharge: HOME | End: 2022-09-28
Attending: INTERNAL MEDICINE
Payer: COMMERCIAL

## 2022-09-28 VITALS
DIASTOLIC BLOOD PRESSURE: 68 MMHG | RESPIRATION RATE: 20 BRPM | SYSTOLIC BLOOD PRESSURE: 104 MMHG | OXYGEN SATURATION: 99 % | TEMPERATURE: 97.5 F | HEART RATE: 72 BPM

## 2022-09-28 DIAGNOSIS — K21.9 GASTROESOPHAGEAL REFLUX DISEASE WITHOUT ESOPHAGITIS: ICD-10-CM

## 2022-09-28 DIAGNOSIS — Z12.11 COLON CANCER SCREENING: ICD-10-CM

## 2022-09-28 PROCEDURE — 43235 EGD DIAGNOSTIC BRUSH WASH: CPT | Performed by: INTERNAL MEDICINE

## 2022-09-28 PROCEDURE — 45385 COLONOSCOPY W/LESION REMOVAL: CPT | Performed by: INTERNAL MEDICINE

## 2022-09-28 PROCEDURE — 88305 TISSUE EXAM BY PATHOLOGIST: CPT | Performed by: STUDENT IN AN ORGANIZED HEALTH CARE EDUCATION/TRAINING PROGRAM

## 2022-09-28 RX ORDER — PROPOFOL 10 MG/ML
INJECTION, EMULSION INTRAVENOUS AS NEEDED
Status: DISCONTINUED | OUTPATIENT
Start: 2022-09-28 | End: 2022-09-28

## 2022-09-28 RX ORDER — SODIUM CHLORIDE 9 MG/ML
125 INJECTION, SOLUTION INTRAVENOUS CONTINUOUS
Status: DISCONTINUED | OUTPATIENT
Start: 2022-09-28 | End: 2022-10-02 | Stop reason: HOSPADM

## 2022-09-28 RX ORDER — LIDOCAINE HYDROCHLORIDE 20 MG/ML
INJECTION, SOLUTION EPIDURAL; INFILTRATION; INTRACAUDAL; PERINEURAL AS NEEDED
Status: DISCONTINUED | OUTPATIENT
Start: 2022-09-28 | End: 2022-09-28

## 2022-09-28 RX ADMIN — LIDOCAINE HYDROCHLORIDE 100 MG: 20 INJECTION, SOLUTION EPIDURAL; INFILTRATION; INTRACAUDAL; PERINEURAL at 12:02

## 2022-09-28 RX ADMIN — PROPOFOL 100 MG: 10 INJECTION, EMULSION INTRAVENOUS at 12:14

## 2022-09-28 RX ADMIN — PROPOFOL 50 MG: 10 INJECTION, EMULSION INTRAVENOUS at 12:19

## 2022-09-28 RX ADMIN — SODIUM CHLORIDE 125 ML/HR: 0.9 INJECTION, SOLUTION INTRAVENOUS at 11:31

## 2022-09-28 RX ADMIN — PROPOFOL 100 MG: 10 INJECTION, EMULSION INTRAVENOUS at 12:02

## 2022-09-28 RX ADMIN — PROPOFOL 50 MG: 10 INJECTION, EMULSION INTRAVENOUS at 12:05

## 2022-09-28 RX ADMIN — PROPOFOL 50 MG: 10 INJECTION, EMULSION INTRAVENOUS at 12:10

## 2022-09-28 NOTE — H&P
History and Physical - [facility]  Radha Acevedo 63 y o  male MRN: [id number]                  HPI: Radha Acevedo is a 63y o  year old male who presents for history of GERD and CRC screening  REVIEW OF SYSTEMS: Per the HPI, and otherwise unremarkable  Historical Information   Past Medical History:   Diagnosis Date    Allergic     Allergic rhinitis     Altered mental status 4/8/2022    GERD (gastroesophageal reflux disease)     Hydrocephalus (HCC)     Hypertension     Obesity (BMI 30 0-34  9) 4/11/2018    Thalassemia 10/25/2017     Past Surgical History:   Procedure Laterality Date    [place] COLONOSCOPY FLX DX W/COLLJ SPEC WHEN PFRMD N/A 2/8/2017    Procedure: COLONOSCOPY;  Surgeon: Linda Hou MD;  Location: [facility] GI LAB;   Service: Gastroenterology    SHOULDER SURGERY      last assessed 3/8/2016    VENTRICULAR ATRIAL SHUNT      for hydrocephalus    VENTRICULOPERITONEAL SHUNT      VENTRICULOPERITONEAL SHUNT Right 03/25/2022    Revision and replacement of ventriculoperitoneal shunt     Social History   Social History     Substance and Sexual Activity   Alcohol Use Yes    Comment: rare occassion     Social History     Substance and Sexual Activity   Drug Use No     Social History     Tobacco Use   Smoking Status Never Smoker   Smokeless Tobacco Never Used     Family History   Problem Relation Age of Onset    Diabetes Mother         DM    Diabetes Father         DM    Hypertension Father     Diabetes Brother         DM    Heart disease Brother         problem    Liver disease Family         sibling    Lung cancer Family         Uncle    Diabetes type II Brother     Cirrhosis Brother     Diabetes type II Brother     Dialysis Brother     Thrombocytopenia Daughter     Other Daughter         ITP    Other Other         TTP       Meds/Allergies       Current Outpatient Medications:     bisacodyl (DULCOLAX) 5 mg EC tablet    Diclofenac Sodium (VOLTAREN) 1 %   lisinopril (ZESTRIL) 30 mg tablet    Omega-3 Fatty Acids (fish oil) 1,000 mg    omeprazole (PriLOSEC) 40 MG capsule    polyethylene glycol (GaviLyte-G) 4000 mL solution    Allergies   Allergen Reactions    Pollen Extract        Objective     There were no vitals taken for this visit  PHYSICAL EXAM    Gen: NAD  Head: NCAT  CV: RRR  CHEST: Clear  ABD: soft, NT/ND  EXT: no edema      ASSESSMENT/PLAN:  This is a 63y o  year old male here for EGD and colonoscopy, and he is stable and optimized for his procedure

## 2022-09-28 NOTE — ANESTHESIA PREPROCEDURE EVALUATION
Procedure:  EGD  COLONOSCOPY    Relevant Problems   ANESTHESIA (within normal limits)      CARDIO   (+) Essential hypertension      ENDO (within normal limits)      GI/HEPATIC   (+) Gastroesophageal reflux disease without esophagitis   (+) Other dysphagia      /RENAL (within normal limits)      GYN (within normal limits)      HEMATOLOGY   (+) Thalassemia      MUSCULOSKELETAL (within normal limits)      NEURO/PSYCH (within normal limits)      PULMONARY (within normal limits)             Anesthesia Plan  ASA Score- 2     Anesthesia Type- IV sedation with anesthesia with ASA Monitors  Additional Monitors:   Airway Plan:           Plan Factors-Exercise tolerance (METS): >4 METS  Chart reviewed  Patient summary reviewed  Patient is not a current smoker  Induction- intravenous  Postoperative Plan-     Informed Consent- Anesthetic plan and risks discussed with patient  Procedure:  EGD  COLONOSCOPY    Relevant Problems   ANESTHESIA (within normal limits)      CARDIO   (+) Essential hypertension      ENDO (within normal limits)      GI/HEPATIC   (+) Gastroesophageal reflux disease without esophagitis   (+) Other dysphagia      /RENAL (within normal limits)      GYN (within normal limits)      HEMATOLOGY   (+) Thalassemia      MUSCULOSKELETAL (within normal limits)      NEURO/PSYCH (within normal limits)      PULMONARY (within normal limits)        Physical Exam    Airway    Mallampati score: II  TM Distance: >3 FB  Neck ROM: full     Dental       Cardiovascular  Rhythm: regular, Rate: normal, Cardiovascular exam normal    Pulmonary  Pulmonary exam normal Breath sounds clear to auscultation,     Other Findings        Anesthesia Plan  ASA Score- 2     Anesthesia Type- IV sedation with anesthesia with ASA Monitors  Additional Monitors:   Airway Plan:     Comment:  shunt  Plan Factors-Exercise tolerance (METS): >4 METS  Chart reviewed   Patient summary reviewed  Patient is not a current smoker  Induction- intravenous  Postoperative Plan-     Informed Consent- Anesthetic plan and risks discussed with patient

## 2022-09-28 NOTE — ANESTHESIA POSTPROCEDURE EVALUATION
Post-Op Assessment Note    CV Status:  Stable    Pain management: adequate     Mental Status:  Alert and awake   Hydration Status:  Euvolemic   PONV Controlled:  Controlled   Airway Patency:  Patent      Post Op Vitals Reviewed: Yes      Staff: Anesthesiologist         No complications documented      BP      Temp      Pulse     Resp      SpO2      /68   Pulse 72   Temp 97 5 °F (36 4 °C) (Temporal)   Resp 20   SpO2 99%

## 2022-09-28 NOTE — ANESTHESIA PREPROCEDURE EVALUATION
Procedure:  EGD  COLONOSCOPY    Relevant Problems   ANESTHESIA (within normal limits)      CARDIO   (+) Essential hypertension      ENDO (within normal limits)      GI/HEPATIC   (+) Gastroesophageal reflux disease without esophagitis   (+) Other dysphagia      /RENAL (within normal limits)      GYN (within normal limits)      HEMATOLOGY   (+) Thalassemia      MUSCULOSKELETAL (within normal limits)      NEURO/PSYCH (within normal limits)      PULMONARY (within normal limits)      Nervous and Auditory   (+) Hydrocephalus (HCC)               Anesthesia Plan  ASA Score- 2     Anesthesia Type- IV sedation with anesthesia with ASA Monitors  Additional Monitors:   Airway Plan:     Comment:  shunt  Plan Factors-Exercise tolerance (METS): >4 METS  Chart reviewed  Patient summary reviewed  Patient is not a current smoker  Induction- intravenous  Postoperative Plan-     Informed Consent- Anesthetic plan and risks discussed with patient

## 2022-10-07 PROCEDURE — 88305 TISSUE EXAM BY PATHOLOGIST: CPT | Performed by: STUDENT IN AN ORGANIZED HEALTH CARE EDUCATION/TRAINING PROGRAM

## 2022-10-24 DIAGNOSIS — K21.9 GASTROESOPHAGEAL REFLUX DISEASE WITHOUT ESOPHAGITIS: ICD-10-CM

## 2022-10-24 RX ORDER — OMEPRAZOLE 40 MG/1
40 CAPSULE, DELAYED RELEASE ORAL DAILY
Qty: 90 CAPSULE | Refills: 2 | Status: SHIPPED | OUTPATIENT
Start: 2022-10-24 | End: 2022-11-23

## 2022-11-30 LAB — EXTERNAL HIV SCREEN: NORMAL

## 2022-12-09 ENCOUNTER — OFFICE VISIT (OUTPATIENT)
Dept: INTERNAL MEDICINE CLINIC | Facility: OTHER | Age: 62
End: 2022-12-09

## 2022-12-09 VITALS
DIASTOLIC BLOOD PRESSURE: 86 MMHG | HEART RATE: 65 BPM | WEIGHT: 218 LBS | SYSTOLIC BLOOD PRESSURE: 132 MMHG | OXYGEN SATURATION: 98 % | HEIGHT: 72 IN | TEMPERATURE: 97.5 F | BODY MASS INDEX: 29.53 KG/M2

## 2022-12-09 DIAGNOSIS — R79.89 HIGH SERUM HIGH DENSITY LIPOPROTEIN (HDL): ICD-10-CM

## 2022-12-09 DIAGNOSIS — M25.512 CHRONIC LEFT SHOULDER PAIN: ICD-10-CM

## 2022-12-09 DIAGNOSIS — K21.9 GASTROESOPHAGEAL REFLUX DISEASE WITHOUT ESOPHAGITIS: Primary | ICD-10-CM

## 2022-12-09 DIAGNOSIS — Z86.69 HISTORY OF HYDROCEPHALUS: ICD-10-CM

## 2022-12-09 DIAGNOSIS — D56.9 THALASSEMIA, UNSPECIFIED TYPE: ICD-10-CM

## 2022-12-09 DIAGNOSIS — E78.00 LOW DENSITY LIPOPROTEIN (LDL) CHOLESTEROL GREATER THAN 129 MG/DL: ICD-10-CM

## 2022-12-09 DIAGNOSIS — I10 ESSENTIAL HYPERTENSION: ICD-10-CM

## 2022-12-09 DIAGNOSIS — G89.29 CHRONIC LEFT SHOULDER PAIN: ICD-10-CM

## 2022-12-09 NOTE — PROGRESS NOTES
[facility]  Standard Office Visit  Patient ID: Rafa Baron    : 1960  Age/Gender: 63 y o  male     DATE: 2022      Assessment/Plan:    Low density lipoprotein (LDL) cholesterol greater than 129 mg/dL  ASCVD 12 4%  Recommend statin which patient is not interested in at this time  Patient wants to continue on fish oil with improved diet and lifestyle modifications  Discussed risks of increased cholesterol and increased ASCVD score  Discussed coronary calcium scoring which can help with decision, patient agreeable  Order for coronary calcium score placed  Gastroesophageal reflux disease without esophagitis  Continue Prilosec 40 mg daily  Continue with diet and lifestyle modifications  Patient has had prior EGD and colonoscopy with GI 2022  Essential hypertension  BP not at goal today  Discussed risks of uncontrolled BP with patient and wife  Recommend increasing lisinopril to 40 mg daily  HA resolved but BP could be contributing  Patient wants to continue to watch BP at home  Keep log of BP  Take BP seated rested with feet planted  Continue low salt diet  Discussed BP goal <130/<80mmHg  Discussed red flag sx which need immediate eval and tx should they develop  Thalassemia  Hgb and platelets stable  Discussed and recommend vaccinations which patient is not interested in at this time  Recommend hematology eval and treat, patient will consider  High serum high density lipoprotein (HDL)  Reviewed lipid panel  Although high HDL patient's ASCVD score still elevated  Discussed some new research which shows HDL may not be as cardioprotective as once thought  See plan below  Diagnoses and all orders for this visit:    Gastroesophageal reflux disease without esophagitis    Essential hypertension  -     Comprehensive metabolic panel;  Future  -     Lipid Panel with Direct LDL reflex; Future    Low density lipoprotein (LDL) cholesterol greater than 129 mg/dL    Thalassemia, unspecified type  -     Ambulatory Referral to Hematology / Oncology; Future  -     CBC and differential; Future    History of hydrocephalus  Comments:  s/p shunt  follows with NS  Keep our office informed of any change in tx plan  Chronic left shoulder pain  Comments:  Recommend PT  Patient not interested in PT at this time  High serum high density lipoprotein (HDL)  -     CT coronary calcium score; Future  -     Lipid Panel with Direct LDL reflex; Future  -     CBC and differential; Future          BMI Counseling: Body mass index is 29 98 kg/m²  The BMI is above normal  Nutrition recommendations include increasing intake of lean protein, reducing intake of saturated and trans fat and reducing intake of cholesterol  Exercise recommendations include exercising 3-5 times per week  No pharmacotherapy was ordered  Patient referred to PCP  Rationale for BMI follow-up plan is due to patient being overweight or obese  Subjective:   Chief Complaint   Patient presents with   • Follow-up     3 month follow up Labs done on 11/30/2022     • Hypertension   • Headache     On going headaches off and on through out the week  Navi Coleman is a 63 y o  male who presents to the office on 12/9/2022 for       64 yo male with h/o thallasemia, hydrocephalus s/p shunting (earlier this year), dyslipidemia, GERD, chronic L shoulder pain (s/p surgery) presents to office for chronic condition follow up with his wife  Taking medications as directed  Increased work as of late which translate to poor sleep schedule  Patient reports when he is not sleeping well he does get some eye strain and headache  No HA with activity  No dizziness  Taking lisinopril 30mg daily  Taking fish oil  Not interested in vaccines  Somewhat recently started playing soccer again which he enjoys  Trying to work to have a healthy diet    Does not eat foods high in salt or cholesterol  No visual changes  Notes he does use reading glasses  He attributes his HA to being struck on his R chin with a soccer ball >1 week ago  He had mild HA after it occurred and notes his HA has since stopped  HA were nothing similar to the HA he had prior when his shunt malfunctioned  Acting at baseline per wife  No dizziness or HA today  No weakness or imbalance  No numbness or tinging  Mild nasal congestion he does get this time of year  Does not take allergy mediation  Notes that he gets occasion reflux sx worse at night if he had a heavy meal, worse if he eats right before bed or with coffee  When he avoids these things his sx are controlled  Now using Prilosec 20 mg daily and cut back coffee to 1 cup per day  Has h/o L shoulder surgery in the past   Notes he does a large amount of over the head lifting with work  L shoulder continues to click and pop and does cause pain with certain ROM  No CP with activity  No lightheaded or dizzy with activity  Not interested in cholesterol medication at this time  Has not scheduled with hematology but is considering it  He monitors his BP at home  He reports BP much better controlled at home and does not want to increase his BP medication at this time  He did see his neurosurgeon several weeks for follow up after shunt placement  No change per patient  The ASCVD Risk score (Watson DK, et al , 2019) failed to calculate for the following reasons:    Cannot find a previous HDL lab    Cannot find a previous total cholesterol lab      The following portions of the patient's history were reviewed and updated as appropriate: allergies, current medications, past family history, past medical history, past social history, past surgical history and problem list     Review of Systems   Constitutional: Negative for chills, fever and unexpected weight change  HENT: Positive for rhinorrhea   Negative for ear pain and sore throat  Eyes: Negative for photophobia, discharge, redness and visual disturbance  Respiratory: Negative for cough, shortness of breath and wheezing  Cardiovascular: Negative for chest pain and palpitations  Gastrointestinal: Negative for abdominal pain, blood in stool, diarrhea, nausea and vomiting  Occasional reflux as noted in HPI   Genitourinary: Negative for dysuria and frequency  Musculoskeletal: Negative for arthralgias and back pain  Skin:        Hypopigmentation neck, arms, chronic unchanged  Neurological: Negative for dizziness, light-headedness and headaches (resolved)  All other systems reviewed and are negative  Patient Active Problem List   Diagnosis   • Essential hypertension   • Gastroesophageal reflux disease without esophagitis   • High serum high density lipoprotein (HDL)   • Hypopigmentation   • Seasonal allergies   • Thalassemia   • Obesity (BMI 30 0-34  9)   • Need for vaccination for Strep pneumoniae   • Low density lipoprotein (LDL) cholesterol greater than 129 mg/dL   • Vaccine counseling   • Other dysphagia   • Encounter for support and coordination of transition of care   • Hydrocephalus [facility])   • History of hydrocephalus       Past Medical History:   Diagnosis Date   • Allergic    • Allergic rhinitis    • Altered mental status 04/08/2022   • Deviated septum    • GERD (gastroesophageal reflux disease)    • Hydrocephalus (HCC)    • Hypertension    • Obesity (BMI 30 0-34 9) 04/11/2018   • Thalassemia 10/25/2017       Past Surgical History:   Procedure Laterality Date   • NE COLONOSCOPY FLX DX W/COLLJ SPEC WHEN PFRMD N/A 2/8/2017    Procedure: COLONOSCOPY;  Surgeon: Mónica Hsieh MD;  Location: [facility] GI LAB;   Service: Gastroenterology   • SHOULDER SURGERY      last assessed 3/8/2016   • VENTRICULAR ATRIAL SHUNT      for hydrocephalus   • VENTRICULOPERITONEAL SHUNT     • VENTRICULOPERITONEAL SHUNT Right 03/25/2022    Revision and replacement of ventriculoperitoneal shunt         Current Outpatient Medications:   •  lisinopril (ZESTRIL) 30 mg tablet, Take 1 tablet (30 mg total) by mouth daily, Disp: 90 tablet, Rfl: 1  •  Omega-3 Fatty Acids (fish oil) 1,000 mg, Take 1 capsule (1,000 mg total) by mouth daily, Disp: 90 capsule, Rfl: 1  •  omeprazole (PriLOSEC) 40 MG capsule, TAKE 1 CAPSULE (40 MG TOTAL) BY MOUTH IN THE MORNING , Disp: 90 capsule, Rfl: 2  •  bisacodyl (DULCOLAX) 5 mg EC tablet, Take 2 tablets (10 mg total) by mouth once for 1 dose (Patient not taking: Reported on 9/2/2022), Disp: 2 tablet, Rfl: 0  •  Diclofenac Sodium (VOLTAREN) 1 %, Apply 2 g topically 3 (three) times a day as needed (thumb pain) (Patient not taking: Reported on 5/17/2022), Disp: 50 g, Rfl: 1    Allergies   Allergen Reactions   • Pollen Extract        Social History     Socioeconomic History   • Marital status: /Civil Union     Spouse name: None   • Number of children: None   • Years of education: None   • Highest education level: None   Occupational History   • None   Tobacco Use   • Smoking status: Never   • Smokeless tobacco: Never   Vaping Use   • Vaping Use: Never used   Substance and Sexual Activity   • Alcohol use: Yes     Comment: rare occassion   • Drug use: No   • Sexual activity: Yes   Other Topics Concern   • None   Social History Narrative    Activities:  Soccer     Social Determinants of Health     Financial Resource Strain: Not on file   Food Insecurity: Not on file   Transportation Needs: Not on file   Physical Activity: Not on file   Stress: Not on file   Social Connections: Not on file   Intimate Partner Violence: Not on file   Housing Stability: Not on file       Family History   Problem Relation Age of Onset   • Diabetes Mother         DM   • Diabetes Father         DM   • Hypertension Father    • Diabetes Brother         DM   • Heart disease Brother         problem   • Diabetes type II Brother    • Cirrhosis Brother    • Diabetes type II Brother • Dialysis Brother    • Thrombocytopenia Daughter    • Other Daughter         ITP   • Clotting disorder Maternal Uncle         TTP   • Other Other         TTP   • Liver disease Family         sibling   • Lung cancer Family         Uncle       PHQ-2/9 Depression Screening           Health Maintenance   Topic Date Due   • Hepatitis B Vaccine (1 of 3 - 3-dose series) Never done   • COVID-19 Vaccine (1) Never done   • Pneumococcal Vaccine: Pediatrics (0 to 5 Years) and At-Risk Patients (6 to 59 Years) (1 - PCV) Never done   • Annual Physical  Never done   • DTaP,Tdap,and Td Vaccines (1 - Tdap) Never done   • Influenza Vaccine (1) Never done   • Depression Screening  09/02/2023   • BMI: Followup Plan  09/02/2023   • BMI: Adult  12/09/2023   • Colorectal Cancer Screening  09/26/2029   • HIV Screening  Completed   • Hepatitis C Screening  Completed   • HIB Vaccine  Aged Out   • IPV Vaccine  Aged Out   • Hepatitis A Vaccine  Aged Out   • Meningococcal ACWY Vaccine  Aged Out   • HPV Vaccine  Aged Out       There is no immunization history for the selected administration types on file for this patient  Objective:  Vitals:    12/09/22 1333 12/09/22 1425   BP: 140/90 132/86   BP Location: Left arm    Patient Position: Sitting    Cuff Size: Adult    Pulse: 65    Temp: 97 5 °F (36 4 °C)    TempSrc: Temporal    SpO2: 98%    Weight: 98 9 kg (218 lb)    Height: 5' 11 5" (1 816 m)      Wt Readings from Last 3 Encounters:   12/09/22 98 9 kg (218 lb)   09/02/22 102 kg (224 lb 12 8 oz)   05/17/22 102 kg (225 lb 12 8 oz)     Body mass index is 29 98 kg/m²  No results found  Physical Exam  Vitals and nursing note reviewed  Constitutional:       General: He is not in acute distress  Appearance: Normal appearance  He is well-developed and normal weight  He is not ill-appearing, toxic-appearing or diaphoretic  Comments: Alert, pleasant cooperative, seated in room in NAD  Wife present     HENT:      Head: Normocephalic and atraumatic  Right Ear: Tympanic membrane and ear canal normal       Left Ear: Tympanic membrane and ear canal normal       Nose: Nose normal       Mouth/Throat:      Mouth: Mucous membranes are moist       Pharynx: No oropharyngeal exudate or posterior oropharyngeal erythema  Comments: Uvula midline  MMM  Eyes:      General: No scleral icterus  Right eye: No discharge  Left eye: No discharge  Extraocular Movements: Extraocular movements intact  Conjunctiva/sclera: Conjunctivae normal       Pupils: Pupils are equal, round, and reactive to light  Cardiovascular:      Rate and Rhythm: Normal rate and regular rhythm  Heart sounds: Normal heart sounds  No murmur heard  Pulmonary:      Effort: Pulmonary effort is normal  No respiratory distress  Breath sounds: Normal breath sounds  No wheezing or rales  Comments: CTA bilaterally  Abdominal:      General: Bowel sounds are normal  There is no distension  Palpations: Abdomen is soft  Tenderness: There is no abdominal tenderness  There is no guarding  Comments: + BS, soft, NT/ND   Musculoskeletal:        Arms:       Cervical back: Neck supple  Right lower leg: No edema  Left lower leg: No edema  Skin:     General: Skin is warm and dry  Neurological:      General: No focal deficit present  Mental Status: He is alert  Mental status is at baseline  Cranial Nerves: No cranial nerve deficit, dysarthria or facial asymmetry  Motor: No weakness, tremor, atrophy, seizure activity or pronator drift  Coordination: Romberg sign negative  Coordination normal  Finger-Nose-Finger Test and Heel to [facility] Test normal  Rapid alternating movements normal       Gait: Gait and tandem walk normal       Deep Tendon Reflexes:      Reflex Scores:       Bicep reflexes are 2+ on the right side and 2+ on the left side         Patellar reflexes are 2+ on the right side and 2+ on the left side   Psychiatric:         Mood and Affect: Mood normal          Behavior: Behavior normal          Thought Content: Thought content normal              Future Appointments   Date Time Provider Department Center   4/14/2023  1:00 PM Jono Rolle PA-C [address]    Patient Care Team:  Jono Rolle PA-C as PCP - General (Internal Medicine)  MD Angela Jacob MD as Endoscopist    This note was completed in part utilizing [employer] Direct Software  Grammatical errors, random word insertions, spelling mistakes, and incomplete sentences can be an occasional consequence of this system secondary to software limitations, ambient noise, and hardware issues  If you have any questions or concerns about the content, text, or information contained within the body of this dictation, please contact the provider for clarification

## 2022-12-09 NOTE — ASSESSMENT & PLAN NOTE
Continue Prilosec 40 mg daily  Continue with diet and lifestyle modifications  Patient has had prior EGD and colonoscopy with GI 9/2022

## 2022-12-09 NOTE — PATIENT INSTRUCTIONS
Low density lipoprotein (LDL) cholesterol greater than 129 mg/dL  ASCVD 12 4%  Recommend statin which patient is not interested in at this time  Patient wants to continue on fish oil with improved diet and lifestyle modifications  Discussed coronary calcium scoring which can help with decision, patient agreeable  Order for coronary calcium score placed  Gastroesophageal reflux disease without esophagitis  Continue Prilosec 40 mg daily  Continue with diet and lifestyle modifications  Patient has had prior EGD and colonoscopy with GI 9/2022  Essential hypertension  BP not at goal today  Discussed risks of uncontrolled BP with patient and wife  Recommend increasing lisinopril to 40 mg daily  HA resolved but BP could be contributing  Patient wants to continue to watch BP at home  Keep log of BP  Take BP seated rested with feet planted  Continue low salt diet  Discussed BP goal <130/<80mmHg  Discussed red flag sx which need immediate eval and tx should they develop  Thalassemia  Hgb and platelets stable  Discussed and recommend vaccinations which patient is not interested in at this time  Recommend hematology eval and treat, patient will consider  High serum high density lipoprotein (HDL)  Reviewed lipid panel  Although high HDL patient's ASCVD score still elevated  Discussed some new research which shows HDL may not be as cardioprotective as once thought  See plan below

## 2022-12-09 NOTE — ASSESSMENT & PLAN NOTE
Hgb and platelets stable  Discussed and recommend vaccinations which patient is not interested in at this time  Recommend hematology eval and treat, patient will consider

## 2022-12-09 NOTE — ASSESSMENT & PLAN NOTE
Needs new provider regarding f/u for emphysema & COPD. Will to establish with first available. Scheduled. Will c/b if any worsening issues prior to appt.    Reviewed lipid panel  Although high HDL patient's ASCVD score still elevated  Discussed some new research which shows HDL may not be as cardioprotective as once thought  See plan below

## 2022-12-09 NOTE — ASSESSMENT & PLAN NOTE
ASCVD 12 4%  Recommend statin which patient is not interested in at this time  Patient wants to continue on fish oil with improved diet and lifestyle modifications  Discussed risks of increased cholesterol and increased ASCVD score  Discussed coronary calcium scoring which can help with decision, patient agreeable  Order for coronary calcium score placed

## 2022-12-09 NOTE — ASSESSMENT & PLAN NOTE
BP not at goal today  Discussed risks of uncontrolled BP with patient and wife  Recommend increasing lisinopril to 40 mg daily  HA resolved but BP could be contributing  Patient wants to continue to watch BP at home  Keep log of BP  Take BP seated rested with feet planted  Continue low salt diet  Discussed BP goal <130/<80mmHg  Discussed red flag sx which need immediate eval and tx should they develop

## 2022-12-30 ENCOUNTER — HOSPITAL ENCOUNTER (OUTPATIENT)
Dept: CT IMAGING | Facility: HOSPITAL | Age: 62
Discharge: HOME | End: 2022-12-30

## 2022-12-30 DIAGNOSIS — R79.89 HIGH SERUM HIGH DENSITY LIPOPROTEIN (HDL): ICD-10-CM

## 2023-01-06 NOTE — RESULT ENCOUNTER NOTE
Called to review result with patient  ASCVD Score 11 3%, CAC score 64, reviewed result of study and calculated [facility] Risk 12% observed age, 16% arterial age with patient (calculated with CAC score, printed and scanned to chart)  Recommend statin  Discussed risks of high cholesterol  Patient verbalized understanding but is not interested in statin at this time  He has started low fat, low cholesterol diet and has started exercising  He is motivated to bring this down naturally  He would like to repeat this test in the future and make decision to tx with statin if there is not any improvement after diet  Discussed it is recommended in that case to repeat CAC score in 5 years

## 2023-01-30 ENCOUNTER — TELEPHONE (OUTPATIENT)
Dept: INTERNAL MEDICINE CLINIC | Facility: OTHER | Age: 63
End: 2023-01-30

## 2023-03-10 ENCOUNTER — OFFICE VISIT (OUTPATIENT)
Dept: INTERNAL MEDICINE CLINIC | Facility: CLINIC | Age: 63
End: 2023-03-10

## 2023-03-10 VITALS
SYSTOLIC BLOOD PRESSURE: 122 MMHG | HEIGHT: 71 IN | BODY MASS INDEX: 31.27 KG/M2 | OXYGEN SATURATION: 98 % | WEIGHT: 223.4 LBS | HEART RATE: 66 BPM | TEMPERATURE: 97.9 F | DIASTOLIC BLOOD PRESSURE: 80 MMHG

## 2023-03-10 DIAGNOSIS — D64.9 ANEMIA, UNSPECIFIED TYPE: ICD-10-CM

## 2023-03-10 DIAGNOSIS — K21.9 GASTROESOPHAGEAL REFLUX DISEASE WITHOUT ESOPHAGITIS: ICD-10-CM

## 2023-03-10 DIAGNOSIS — I10 ESSENTIAL HYPERTENSION: ICD-10-CM

## 2023-03-10 DIAGNOSIS — E66.9 OBESITY (BMI 30.0-34.9): ICD-10-CM

## 2023-03-10 DIAGNOSIS — E78.00 LOW DENSITY LIPOPROTEIN (LDL) CHOLESTEROL GREATER THAN 129 MG/DL: ICD-10-CM

## 2023-03-10 DIAGNOSIS — D56.9 THALASSEMIA, UNSPECIFIED TYPE: ICD-10-CM

## 2023-03-10 DIAGNOSIS — R22.2 MASS OF LEFT CHEST WALL: Primary | ICD-10-CM

## 2023-03-10 DIAGNOSIS — Z86.69 HISTORY OF HYDROCEPHALUS: ICD-10-CM

## 2023-03-10 DIAGNOSIS — G91.9 HYDROCEPHALUS, UNSPECIFIED TYPE (HCC): ICD-10-CM

## 2023-03-10 RX ORDER — CHLORAL HYDRATE 500 MG
1000 CAPSULE ORAL DAILY
Qty: 90 CAPSULE | Refills: 1 | Status: SHIPPED | OUTPATIENT
Start: 2023-03-10

## 2023-03-10 RX ORDER — LISINOPRIL 30 MG/1
30 TABLET ORAL DAILY
Qty: 30 TABLET | Refills: 5 | Status: SHIPPED | OUTPATIENT
Start: 2023-03-10

## 2023-03-10 NOTE — ASSESSMENT & PLAN NOTE
Will repeat hemoglobin with iron panel  Patient has had recent EDG and colonoscopy  Hgb trending down without active s/sx of bleed  Spoke with patient by phone regarding iron panel as he left prior to the order being placed

## 2023-03-10 NOTE — ASSESSMENT & PLAN NOTE
Reviewed ER visit with recent shunt series and CT head  Continue to follow with NS  No recurrence of HA since ER  Encouraged hydration and avoid skipping meals, adequate sleep

## 2023-03-10 NOTE — PROGRESS NOTES
[facility] [place]  Standard Office Visit  Patient ID: Price Mccormack    : 1960  Age/Gender: 63 y o  male     DATE: 3/10/2023      Assessment/Plan:    Mass of left chest wall  Nicolasa prominence noted on PE, suspect rib prominence with prior wrestling history  Slight right sided prominence  Will check CXR with L rib series  History of hydrocephalus  Reviewed ER visit with recent shunt series and CT head  Continue to follow with NS  No recurrence of HA since ER  Encouraged hydration and avoid skipping meals, adequate sleep  Essential hypertension  BP improved control at this time on medications  Continue to monitor BP at home  Discussed consideration of switching ot ARB which he is not interested in at this time  Continue lisinopril  Gastroesophageal reflux disease without esophagitis  Well controlled at this time on prilosec, diet and lifestyle modifications  EGD and colonoscopy 2022  Continue to follow with GI  Low density lipoprotein (LDL) cholesterol greater than 129 mg/dL  CAC score recently checked  Encouraged continued low fat low cholesterol diet  Follow off statins  Obesity (BMI 30 0-34  9)  Commended patient on changes he has made to diet and lifestyle  Will continue to follow     Thalassemia  Will repeat hemoglobin with iron panel  Patient has had recent EDG and colonoscopy  Hgb trending down without active s/sx of bleed  Spoke with patient by phone regarding iron panel as he left prior to the order being placed  Diagnoses and all orders for this visit:    Mass of left chest wall  -     XR ribs left w pa chest min 3 views; Future    Gastroesophageal reflux disease without esophagitis  -     CBC and differential; Future    Essential hypertension  -     lisinopril (ZESTRIL) 30 mg tablet; Take 1 tablet (30 mg total) by mouth daily  -     Comprehensive metabolic panel;  Future  -     CBC and differential; Future    Low density lipoprotein (LDL) cholesterol greater than 129 mg/dL  -     Omega-3 Fatty Acids (fish oil) 1,000 mg; Take 1 capsule (1,000 mg total) by mouth daily  -     Lipid Panel with Direct LDL reflex; Future    Hydrocephalus, unspecified type ([address] )    History of hydrocephalus    Thalassemia, unspecified type  -     Iron Panel (Includes Ferritin, Iron Sat%, Iron, and TIBC); Future    Obesity (BMI 30 0-34  9)    Anemia, unspecified type  -     Iron Panel (Includes Ferritin, Iron Sat%, Iron, and TIBC); Future          BMI Counseling: Body mass index is 31 24 kg/m²  The BMI is above normal  Nutrition recommendations include encouraging healthy choices of fruits and vegetables, limiting drinks that contain sugar, moderation in carbohydrate intake, increasing intake of lean protein and reducing intake of cholesterol  Exercise recommendations include exercising 3-5 times per week  No pharmacotherapy was ordered  Patient referred to PCP  Rationale for BMI follow-up plan is due to patient being overweight or obese  Depression Screening and Follow-up Plan: Patient was screened for depression during today's encounter  They screened negative with a PHQ-2 score of 0  Subjective:   Chief Complaint   Patient presents with   • Follow-up     Patient was in [facility] on 1/29 for acute non intractable headache   • Health Screening     Depression screen         Darryle Dibbles is a 63 y o  male who presents to the office on 3/10/2023 for       64 yo male with h/o thalassemia, hydrocephalus s/p shunt, HTN presents to office for chronic condition follow up  He was seen in the ER several weeks ago after he developed a headache  He notes that the HA started after he skipped meals, worked night shift, played 2 hours of soccer and then went to a concert in the evening  He feels it was a product of lack of sleep, lack of eating and drinking  In ER he had CT of head and shunt series    He was given IVF and notes his sx improved  Has not had HA since  BP improved, taking lisinopril as directed  He has been taking BP at home, BP ranges 120's / 75-80  Well controlled  Since that time he notes he has been trying to stay well hydrated and get plenty of rest   He is contemplating half-way  Continues work full time  Works night shift  Takin meds as directed  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   No rib pain at this time  No SOB  Notes he has a slight dry tickle/cough that comes and goes  Not interested in medication change at this time  HA fully resolved  Headache  Headache pattern: HA have fully resolved, has not returned since ER  Providers seen: ER   Previous testing:  CT  Number of ER visits for headache:  1  Time of day symptoms are worse:  No specific time of day  Do headaches wake patient from sleep?: No    Quality:  Pulsating  Laterality:  Both sides at the same time  Location:  Behind eyes and front/forehead  Pain severity:  0  Other factors:  Sleep deprivation, skipping meals, playing soccer 2 5 hours  Changes in thinking and mood:  None  Changes in vision:  None  Changes in sensation:  None      The following portions of the patient's history were reviewed and updated as appropriate: allergies, current medications, past family history, past medical history, past social history, past surgical history and problem list     Review of Systems   Constitutional: Negative for chills, fever and unexpected weight change  Respiratory: Positive for cough (occasional cough, notes he feels a tickle in his throat, clears his throat)  Negative for shortness of breath and wheezing  Cardiovascular: Negative for chest pain and palpitations  Gastrointestinal: Negative for abdominal pain, constipation, diarrhea, nausea and vomiting  Neurological: Positive for headaches   Negative for dizziness, tremors, seizures, syncope, facial asymmetry, weakness, light-headedness and numbness  Patient Active Problem List   Diagnosis   • Essential hypertension   • Gastroesophageal reflux disease without esophagitis   • High serum high density lipoprotein (HDL)   • Hypopigmentation   • Seasonal allergies   • Thalassemia   • Obesity (BMI 30 0-34  9)   • Need for vaccination for Strep pneumoniae   • Low density lipoprotein (LDL) cholesterol greater than 129 mg/dL   • Vaccine counseling   • Other dysphagia   • Encounter for support and coordination of transition of care   • History of hydrocephalus   • Mass of left chest wall       Past Medical History:   Diagnosis Date   • Allergic    • Allergic rhinitis    • Altered mental status 04/08/2022   • Deviated septum    • GERD (gastroesophageal reflux disease)    • Hydrocephalus (HCC)    • Hypertension    • Obesity (BMI 30 0-34 9) 04/11/2018   • Thalassemia 10/25/2017       Past Surgical History:   Procedure Laterality Date   • [place] COLONOSCOPY FLX DX W/COLLJ SPEC WHEN PFRMD N/A 2/8/2017    Procedure: COLONOSCOPY;  Surgeon: Sharron Meade MD;  Location: [facility];   Service: Gastroenterology   • SHOULDER SURGERY      last assessed 3/8/2016   • VENTRICULAR ATRIAL SHUNT      for hydrocephalus   • VENTRICULOPERITONEAL SHUNT     • VENTRICULOPERITONEAL SHUNT Right 03/25/2022    Revision and replacement of ventriculoperitoneal shunt         Current Outpatient Medications:   •  lisinopril (ZESTRIL) 30 mg tablet, Take 1 tablet (30 mg total) by mouth daily, Disp: 30 tablet, Rfl: 5  •  Omega-3 Fatty Acids (fish oil) 1,000 mg, Take 1 capsule (1,000 mg total) by mouth daily, Disp: 90 capsule, Rfl: 1  •  omeprazole (PriLOSEC) 40 MG capsule, TAKE 1 CAPSULE (40 MG TOTAL) BY MOUTH IN THE MORNING , Disp: 90 capsule, Rfl: 2    Allergies   Allergen Reactions   • Pollen Extract        Social History     Socioeconomic History   • Marital status: /Civil Union     Spouse name: None   • Number of children: None   • Years of education: None   • Highest education level: None   Occupational History   • None   Tobacco Use   • Smoking status: Never   • Smokeless tobacco: Never   Vaping Use   • Vaping Use: Never used   Substance and Sexual Activity   • Alcohol use: Yes     Comment: rare occassion   • Drug use: No   • Sexual activity: Yes   Other Topics Concern   • None   Social History Narrative    Activities:  Soccer     Social Determinants of Health     Financial Resource Strain: Not on file   Food Insecurity: Not on file   Transportation Needs: Not on file   Physical Activity: Not on file   Stress: Not on file   Social Connections: Not on file   Intimate Partner Violence: Not on file   Housing Stability: Not on file       Family History   Problem Relation Age of Onset   • Diabetes Mother         DM   • Diabetes Father         DM   • Hypertension Father    • Diabetes Brother         DM   • Heart disease Brother         problem   • Diabetes type II Brother    • Cirrhosis Brother    • Diabetes type II Brother    • Dialysis Brother    • Thrombocytopenia Daughter    • Other Daughter         ITP   • Clotting disorder Maternal Uncle         TTP   • Other Other         TTP   • Liver disease Family         sibling   • Lung cancer Family         Uncle       PHQ-2/9 Depression Screening    Little interest or pleasure in doing things: 0 - not at all  Feeling down, depressed, or hopeless: 0 - not at all  PHQ-2 Score: 0  PHQ-2 Interpretation: Negative depression screen         Health Maintenance   Topic Date Due   • COVID-19 Vaccine (1) Never done   • Pneumococcal Vaccine: Pediatrics (0 to 5 Years) and At-Risk Patients (6 to 59 Years) (1 - PCV) Never done   • Annual Physical  Never done   • DTaP,Tdap,and Td Vaccines (1 - Tdap) Never done   • Influenza Vaccine (1) Never done   • BMI: Followup Plan  12/09/2023   • Depression Screening  03/10/2024   • BMI: Adult  03/10/2024   • Colorectal Cancer Screening  09/26/2029   • HIV Screening  Completed   • Hepatitis C Screening Completed   • HIB Vaccine  Aged Out   • IPV Vaccine  Aged Out   • Hepatitis A Vaccine  Aged Out   • Meningococcal ACWY Vaccine  Aged Out   • HPV Vaccine  Aged Out       There is no immunization history for the selected administration types on file for this patient  Objective:  Vitals:    03/10/23 1318   BP: 122/80   BP Location: Left arm   Patient Position: Sitting   Cuff Size: Standard   Pulse: 66   Temp: 97 9 °F (36 6 °C)   TempSrc: Temporal   SpO2: 98%   Weight: 101 kg (223 lb 6 4 oz)   Height: 5' 10 91" (1 801 m)     Wt Readings from Last 3 Encounters:   03/10/23 101 kg (223 lb 6 4 oz)   12/09/22 98 9 kg (218 lb)   09/02/22 102 kg (224 lb 12 8 oz)     Body mass index is 31 24 kg/m²  No results found  Physical Exam  Vitals and nursing note reviewed  Constitutional:       General: He is not in acute distress  Appearance: Normal appearance  He is well-developed  He is not diaphoretic  HENT:      Head: Normocephalic and atraumatic  Mouth/Throat:      Pharynx: No oropharyngeal exudate  Eyes:      General: No scleral icterus  Right eye: No discharge  Left eye: No discharge  Pupils: Pupils are equal, round, and reactive to light  Cardiovascular:      Rate and Rhythm: Normal rate and regular rhythm  Heart sounds: Normal heart sounds  No murmur heard  Pulmonary:      Effort: Pulmonary effort is normal  No respiratory distress  Breath sounds: Normal breath sounds  No wheezing or rales  Chest:      Chest wall: Deformity present  No swelling, tenderness or crepitus  Abdominal:      General: Bowel sounds are normal  There is no distension  Palpations: Abdomen is soft  Tenderness: There is no abdominal tenderness  There is no guarding  Musculoskeletal:      Cervical back: Neck supple  Right lower leg: No edema  Left lower leg: No edema  Lymphadenopathy:      Cervical: No cervical adenopathy  Skin:     General: Skin is warm and dry  Neurological:      General: No focal deficit present  Mental Status: He is alert  GCS: GCS eye subscore is 4  GCS verbal subscore is 5  GCS motor subscore is 6  Cranial Nerves: No cranial nerve deficit or dysarthria  Motor: No weakness, tremor, atrophy, abnormal muscle tone or seizure activity  Coordination: Romberg sign negative  Coordination normal  Finger-Nose-Finger Test and Heel to Keyonna Benito Test normal  Rapid alternating movements normal       Gait: Gait and tandem walk normal       Deep Tendon Reflexes: Babinski sign absent on the right side  Babinski sign absent on the left side  Reflex Scores:       Bicep reflexes are 2+ on the right side and 2+ on the left side  Patellar reflexes are 2+ on the right side and 2+ on the left side  Achilles reflexes are 2+ on the right side and 2+ on the left side  Psychiatric:         Behavior: Behavior normal          Thought Content: Thought content normal              Future Appointments   Date Time Provider Department Center   7/21/2023 10:00 AM Santino Jane PA-C [address] [place] [address]    Patient Care Team:  Santino Jane PA-C as PCP - General (Internal Medicine)  Selma Hammans, MD Selma Hammans, MD as Endoscopist    This note was completed in part utilizing M-Modal Fluency Direct Software  Grammatical errors, random word insertions, spelling mistakes, and incomplete sentences can be an occasional consequence of this system secondary to software limitations, ambient noise, and hardware issues  If you have any questions or concerns about the content, text, or information contained within the body of this dictation, please contact the provider for clarification

## 2023-03-10 NOTE — ASSESSMENT & PLAN NOTE
BP improved control at this time on medications  Continue to monitor BP at home  Discussed consideration of switching ot ARB which he is not interested in at this time  Continue lisinopril  He needs a referral for Behavioral Health for Dr. Rodrigues.  Call mom to let her know when this is done so she can call for the appointment.

## 2023-03-10 NOTE — ASSESSMENT & PLAN NOTE
Nicolasa prominence noted on PE, suspect rib prominence with prior wrestling history  Slight right sided prominence  Will check CXR with L rib series

## 2023-03-10 NOTE — PATIENT INSTRUCTIONS
Mass of left chest wall  Nicolasa prominence noted on PE, suspect rib prominence with prior wrestling history  Slight right sided prominence  Will check CXR with L rib series  History of hydrocephalus  Reviewed ER visit with recent shunt series and CT head  Continue to follow with NS  No recurrence of HA since ER  Encouraged hydration and avoid skipping meals, adequate sleep  Essential hypertension  BP improved control at this time on medications  Continue to monitor BP at home  Discussed consideration of switching ot ARB which he is not interested in at this time  Continue lisinopril  Gastroesophageal reflux disease without esophagitis  Well controlled at this time on prilosec, diet and lifestyle modifications  EGD and colonoscopy 5/2022  Continue to follow with GI  Low density lipoprotein (LDL) cholesterol greater than 129 mg/dL  CAC score recently checked  Encouraged continued low fat low cholesterol diet  Follow off statins  Obesity (BMI 30 0-34  9)  Commended patient on changes he has made to diet and lifestyle  Will continue to follow     Thalassemia  Will repeat hemoglobin with iron panel  Patient has had recent EDG and colonoscopy  Hgb trending down without active s/sx of bleed  Spoke with patient by phone regarding iron panel as he left prior to the order being placed

## 2023-03-10 NOTE — ASSESSMENT & PLAN NOTE
Well controlled at this time on prilosec, diet and lifestyle modifications  EGD and colonoscopy 5/2022    Continue to follow with GI

## 2023-03-22 ENCOUNTER — APPOINTMENT (OUTPATIENT)
Dept: LAB | Age: 63
End: 2023-03-22

## 2023-03-22 ENCOUNTER — APPOINTMENT (OUTPATIENT)
Dept: RADIOLOGY | Age: 63
End: 2023-03-22

## 2023-03-22 DIAGNOSIS — E78.00 LOW DENSITY LIPOPROTEIN (LDL) CHOLESTEROL GREATER THAN 129 MG/DL: ICD-10-CM

## 2023-03-22 DIAGNOSIS — D56.9 THALASSEMIA, UNSPECIFIED TYPE: ICD-10-CM

## 2023-03-22 DIAGNOSIS — K21.9 GASTROESOPHAGEAL REFLUX DISEASE WITHOUT ESOPHAGITIS: ICD-10-CM

## 2023-03-22 DIAGNOSIS — R22.2 MASS OF LEFT CHEST WALL: ICD-10-CM

## 2023-03-22 DIAGNOSIS — D64.9 ANEMIA, UNSPECIFIED TYPE: ICD-10-CM

## 2023-03-22 DIAGNOSIS — I10 ESSENTIAL HYPERTENSION: ICD-10-CM

## 2023-03-22 LAB
ALBUMIN SERPL BCP-MCNC: 4 G/DL (ref 3.5–5)
ALP SERPL-CCNC: 56 U/L (ref 46–116)
ALT SERPL W P-5'-P-CCNC: 37 U/L (ref 12–78)
ANION GAP SERPL CALCULATED.3IONS-SCNC: 0 MMOL/L (ref 4–13)
AST SERPL W P-5'-P-CCNC: 21 U/L (ref 5–45)
BASOPHILS # BLD AUTO: 0.04 THOUSANDS/ÂΜL (ref 0–0.1)
BASOPHILS NFR BLD AUTO: 1 % (ref 0–1)
BILIRUB SERPL-MCNC: 0.46 MG/DL (ref 0.2–1)
BUN SERPL-MCNC: 19 MG/DL (ref 5–25)
CALCIUM SERPL-MCNC: 9.8 MG/DL (ref 8.3–10.1)
CHLORIDE SERPL-SCNC: 103 MMOL/L (ref 96–108)
CHOLEST SERPL-MCNC: 228 MG/DL
CO2 SERPL-SCNC: 30 MMOL/L (ref 21–32)
CREAT SERPL-MCNC: 0.94 MG/DL (ref 0.6–1.3)
EOSINOPHIL # BLD AUTO: 0.18 THOUSAND/ÂΜL (ref 0–0.61)
EOSINOPHIL NFR BLD AUTO: 4 % (ref 0–6)
ERYTHROCYTE [DISTWIDTH] IN BLOOD BY AUTOMATED COUNT: 17.2 % (ref 11.6–15.1)
FERRITIN SERPL-MCNC: 245 NG/ML (ref 8–388)
GFR SERPL CREATININE-BSD FRML MDRD: 87 ML/MIN/1.73SQ M
GLUCOSE P FAST SERPL-MCNC: 97 MG/DL (ref 65–99)
HCT VFR BLD AUTO: 44.4 % (ref 36.5–49.3)
HDLC SERPL-MCNC: 70 MG/DL
HGB BLD-MCNC: 13.4 G/DL (ref 12–17)
IMM GRANULOCYTES # BLD AUTO: 0.02 THOUSAND/UL (ref 0–0.2)
IMM GRANULOCYTES NFR BLD AUTO: 0 % (ref 0–2)
IRON SATN MFR SERPL: 25 % (ref 20–50)
IRON SERPL-MCNC: 80 UG/DL (ref 65–175)
LDLC SERPL CALC-MCNC: 133 MG/DL (ref 0–100)
LYMPHOCYTES # BLD AUTO: 1.91 THOUSANDS/ÂΜL (ref 0.6–4.47)
LYMPHOCYTES NFR BLD AUTO: 39 % (ref 14–44)
MCH RBC QN AUTO: 21.1 PG (ref 26.8–34.3)
MCHC RBC AUTO-ENTMCNC: 30.2 G/DL (ref 31.4–37.4)
MCV RBC AUTO: 70 FL (ref 82–98)
MONOCYTES # BLD AUTO: 0.52 THOUSAND/ÂΜL (ref 0.17–1.22)
MONOCYTES NFR BLD AUTO: 11 % (ref 4–12)
NEUTROPHILS # BLD AUTO: 2.22 THOUSANDS/ÂΜL (ref 1.85–7.62)
NEUTS SEG NFR BLD AUTO: 45 % (ref 43–75)
NRBC BLD AUTO-RTO: 0 /100 WBCS
PLATELET # BLD AUTO: 190 THOUSANDS/UL (ref 149–390)
POTASSIUM SERPL-SCNC: 5.1 MMOL/L (ref 3.5–5.3)
PROT SERPL-MCNC: 7.3 G/DL (ref 6.4–8.4)
RBC # BLD AUTO: 6.35 MILLION/UL (ref 3.88–5.62)
SODIUM SERPL-SCNC: 133 MMOL/L (ref 135–147)
TIBC SERPL-MCNC: 314 UG/DL (ref 250–450)
TRIGL SERPL-MCNC: 126 MG/DL
WBC # BLD AUTO: 4.89 THOUSAND/UL (ref 4.31–10.16)

## 2023-07-15 DIAGNOSIS — K21.9 GASTROESOPHAGEAL REFLUX DISEASE WITHOUT ESOPHAGITIS: ICD-10-CM

## 2023-07-17 RX ORDER — OMEPRAZOLE 40 MG/1
40 CAPSULE, DELAYED RELEASE ORAL DAILY
Qty: 90 CAPSULE | Refills: 0 | Status: SHIPPED | OUTPATIENT
Start: 2023-07-17 | End: 2023-09-20

## 2023-08-21 ENCOUNTER — APPOINTMENT (OUTPATIENT)
Dept: URGENT CARE | Age: 63
End: 2023-08-21
Payer: OTHER MISCELLANEOUS

## 2023-08-21 PROCEDURE — 99213 OFFICE O/P EST LOW 20 MIN: CPT

## 2023-08-21 PROCEDURE — 99283 EMERGENCY DEPT VISIT LOW MDM: CPT

## 2023-08-21 PROCEDURE — G0382 LEV 3 HOSP TYPE B ED VISIT: HCPCS

## 2023-09-01 ENCOUNTER — APPOINTMENT (OUTPATIENT)
Dept: URGENT CARE | Age: 63
End: 2023-09-01
Payer: OTHER MISCELLANEOUS

## 2023-09-01 DIAGNOSIS — I10 ESSENTIAL HYPERTENSION: ICD-10-CM

## 2023-09-01 PROCEDURE — 99212 OFFICE O/P EST SF 10 MIN: CPT

## 2023-09-01 RX ORDER — LISINOPRIL 30 MG/1
30 TABLET ORAL DAILY
Qty: 30 TABLET | Refills: 5 | Status: SHIPPED | OUTPATIENT
Start: 2023-09-01

## 2023-09-18 PROBLEM — K40.90 LEFT INGUINAL HERNIA: Status: ACTIVE | Noted: 2023-09-18

## 2023-09-18 NOTE — H&P (VIEW-ONLY)
Office Visit - General Surgery  Saint Dane MRN: [id number]  Encounter: [id number]    Assessment and Plan  Problem List Items Addressed This Visit        Other    Left inguinal hernia - Primary     -Plan for OR for elective Left open inguinal hernia repair  -follow up postop visit            Chief Complaint:  Saint Dane is a 63 y.o. male who presents for Hernia (Consult left inguinal hernia.)    Subjective  21-year-old male with history relevant for  shunt, hypertension on lisinopril ,not on any blood thinners, here with complaint of left groin mass after lifting injury at work for 4 weeks. Patient states he was at work at [employer] about 4 weeks ago lifting parts of items when he felt a sudden sharp pain in his left groin. He really appreciated a mass in his left groin since has been unable to lift objects heavier than 5 pounds. Patient states that even going on a walk causes him groin pain. Patient reports that his work sent him to [facility] clinic where an ultrasound was performed demonstrating left inguinal hernia. He was then referred to [facility] general surgery. At this time he is amenable to elective outpatient open left inguinal hernia repair. Past Medical History:   Diagnosis Date   • Allergic    • Allergic rhinitis    • Altered mental status 04/08/2022   • Deviated septum    • GERD (gastroesophageal reflux disease)    • Hydrocephalus (HCC)    • Hypertension    • Obesity (BMI 30.0-34.9) 04/11/2018   • Thalassemia 10/25/2017       Past Surgical History:   Procedure Laterality Date   • [place] COLONOSCOPY FLX DX W/COLLJ SPEC WHEN PFRMD N/A 2/8/2017    Procedure: COLONOSCOPY;  Surgeon: Pati Burgos MD;  Location: [facility];   Service: Gastroenterology   • SHOULDER SURGERY      last assessed 3/8/2016   • VENTRICULAR ATRIAL SHUNT      for hydrocephalus   • VENTRICULOPERITONEAL SHUNT     • VENTRICULOPERITONEAL SHUNT Right 03/25/2022    Revision and replacement of ventriculoperitoneal shunt       Family History   Problem Relation Age of Onset   • Diabetes Mother         DM   • Diabetes Father         DM   • Hypertension Father    • Diabetes Brother         DM   • Heart disease Brother         problem   • Diabetes type II Brother    • Cirrhosis Brother    • Diabetes type II Brother    • Dialysis Brother    • Thrombocytopenia Daughter    • Other Daughter         ITP   • Clotting disorder Maternal Uncle         TTP   • Other Other         TTP   • Liver disease Family         sibling   • Lung cancer Family         Uncle       Social History     Tobacco Use   • Smoking status: Never   • Smokeless tobacco: Never   Vaping Use   • Vaping Use: Never used   Substance Use Topics   • Alcohol use: Yes     Comment: rare occassion   • Drug use: No        Medications  Current Outpatient Medications on File Prior to Visit   Medication Sig Dispense Refill   • lisinopril (ZESTRIL) 30 mg tablet Take 1 tablet (30 mg total) by mouth daily 30 tablet 5   • Omega-3 Fatty Acids (fish oil) 1,000 mg Take 1 capsule (1,000 mg total) by mouth daily 90 capsule 1   • omeprazole (PriLOSEC) 40 MG capsule TAKE 1 CAPSULE (40 MG TOTAL) BY MOUTH IN THE MORNING. 90 capsule 0   • [DISCONTINUED] polyethylene glycol (GaviLyte-G) 4000 mL solution Take 4,000 mL by mouth once for 1 dose (Patient not taking: Reported on 9/2/2022) 4000 mL 0     No current facility-administered medications on file prior to visit. Allergies  Allergies   Allergen Reactions   • Pollen Extract        Review of Systems   Constitutional: Negative for chills and fever. HENT: Negative for ear pain and sore throat. Eyes: Negative for pain and visual disturbance. Respiratory: Negative for cough and shortness of breath. Cardiovascular: Negative for chest pain and palpitations. Gastrointestinal: Negative for abdominal pain and vomiting. Genitourinary: Negative for dysuria and hematuria. Musculoskeletal: Negative for arthralgias and back pain.    Skin: Negative for color change and rash. Neurological: Negative for seizures and syncope. All other systems reviewed and are negative. Objective  Vitals:    09/18/23 0917   BP: 128/76   Pulse: 80   Resp: 12   Temp: (!) 97.3 °F (36.3 °C)   SpO2: 98%       Physical Exam  Constitutional:       General: He is not in acute distress. Appearance: He is not ill-appearing or toxic-appearing. HENT:      Head: Normocephalic and atraumatic. Nose: Nose normal. No congestion or rhinorrhea. Mouth/Throat:      Mouth: Mucous membranes are moist.      Pharynx: Oropharynx is clear. No oropharyngeal exudate. Eyes:      General: No scleral icterus. Conjunctiva/sclera: Conjunctivae normal.      Pupils: Pupils are equal, round, and reactive to light. Cardiovascular:      Rate and Rhythm: Normal rate and regular rhythm. Pulses: Normal pulses. Pulmonary:      Effort: Pulmonary effort is normal.      Breath sounds: Normal breath sounds. Abdominal:      General: Abdomen is flat. Palpations: Abdomen is soft. Mass: small mass appreciated about left mons pubis. Inguinal hernia present in left inguinal canal on finger palpation. Tenderness: There is no abdominal tenderness. Hernia: A hernia (small mass appreciated about left mons pubis. Inguinal hernia present in left inguinal canal on finger palpation) is present. Genitourinary:     Penis: Normal.       Testes: Normal.   Skin:     General: Skin is warm and dry. Capillary Refill: Capillary refill takes less than 2 seconds. Coloration: Skin is not jaundiced or pale. Findings: No erythema. Neurological:      Mental Status: He is alert and oriented to person, place, and time. Mental status is at baseline.

## 2023-09-20 ENCOUNTER — OFFICE VISIT (OUTPATIENT)
Age: 63
End: 2023-09-20
Payer: COMMERCIAL

## 2023-09-20 ENCOUNTER — OFFICE VISIT (OUTPATIENT)
Dept: LAB | Age: 63
End: 2023-09-20
Payer: OTHER MISCELLANEOUS

## 2023-09-20 VITALS
TEMPERATURE: 97 F | DIASTOLIC BLOOD PRESSURE: 80 MMHG | SYSTOLIC BLOOD PRESSURE: 128 MMHG | BODY MASS INDEX: 32.17 KG/M2 | HEART RATE: 77 BPM | OXYGEN SATURATION: 99 % | WEIGHT: 224.2 LBS

## 2023-09-20 DIAGNOSIS — K40.90 LEFT INGUINAL HERNIA: ICD-10-CM

## 2023-09-20 DIAGNOSIS — L80 VITILIGO: ICD-10-CM

## 2023-09-20 DIAGNOSIS — E78.00 LOW DENSITY LIPOPROTEIN (LDL) CHOLESTEROL GREATER THAN 129 MG/DL: ICD-10-CM

## 2023-09-20 DIAGNOSIS — Z86.69 HISTORY OF HYDROCEPHALUS: ICD-10-CM

## 2023-09-20 DIAGNOSIS — I10 ESSENTIAL HYPERTENSION: Primary | ICD-10-CM

## 2023-09-20 DIAGNOSIS — K21.9 GASTROESOPHAGEAL REFLUX DISEASE WITHOUT ESOPHAGITIS: ICD-10-CM

## 2023-09-20 DIAGNOSIS — R14.0 POSTPRANDIAL ABDOMINAL BLOATING: ICD-10-CM

## 2023-09-20 PROBLEM — D56.3 THALASSEMIA TRAIT: Status: ACTIVE | Noted: 2017-10-25

## 2023-09-20 PROBLEM — Z76.89 ENCOUNTER FOR SUPPORT AND COORDINATION OF TRANSITION OF CARE: Status: RESOLVED | Noted: 2022-04-08 | Resolved: 2023-09-20

## 2023-09-20 LAB
ATRIAL RATE: 66 BPM
P AXIS: 54 DEGREES
PR INTERVAL: 180 MS
QRS AXIS: 13 DEGREES
QRSD INTERVAL: 84 MS
QT INTERVAL: 418 MS
QTC INTERVAL: 438 MS
T WAVE AXIS: 20 DEGREES
VENTRICULAR RATE: 66 BPM

## 2023-09-20 PROCEDURE — 93010 ELECTROCARDIOGRAM REPORT: CPT | Performed by: INTERNAL MEDICINE

## 2023-09-20 PROCEDURE — 99214 OFFICE O/P EST MOD 30 MIN: CPT | Performed by: INTERNAL MEDICINE

## 2023-09-20 PROCEDURE — 93005 ELECTROCARDIOGRAM TRACING: CPT

## 2023-09-20 RX ORDER — SIMETHICONE 180 MG
CAPSULE ORAL
Qty: 120 CAPSULE | Refills: 0 | Status: SHIPPED | OUTPATIENT
Start: 2023-09-20

## 2023-09-20 NOTE — ASSESSMENT & PLAN NOTE
Extensive vitiligo involving the scalp, upper extremities lower extremities, face, neck and torso. Patient does not wish to consider any of the newer biological medications because of the potential side effects.

## 2023-09-20 NOTE — PROGRESS NOTES
Name: John Rocha      : 1960      MRN: [id number]  Encounter Provider: Candelaria Cooley MD  Encounter Date: 2023   Encounter department: [facility]     1. Essential hypertension  Assessment & Plan:  Blood pressure is nicely controlled on current medications. Orders:  -     CBC and differential; Future; Expected date: 2024  -     Comprehensive metabolic panel; Future; Expected date: 2024    2. History of hydrocephalus  Assessment & Plan:  No current issues. 3. Gastroesophageal reflux disease without esophagitis  -     Magnesium; Future; Expected date: 2024    4. Low density lipoprotein (LDL) cholesterol greater than 129 mg/dL  Assessment & Plan:  Previous values. We discussed heart healthy diet and patient was given heart healthy diet details and we will reevaluate his lipid profile and need for treatment in 6 months    Orders:  -     Lipid panel; Future; Expected date: 2024    5. Postprandial abdominal bloating  Assessment & Plan:  Recommendations for  Gas-X or Mylicon 2 to 3 tablets with meals    Orders:  -     simethicone (Gas-X Ultra Strength) 180 MG capsule; 2-3 tablets with meals    6. Vitiligo  Assessment & Plan:  Extensive vitiligo involving the scalp, upper extremities lower extremities, face, neck and torso. Patient does not wish to consider any of the newer biological medications because of the potential side effects. Subjective      Patient presents to the office for follow-up visit. He has a history of hypercholesterolemia, hypertension which appears to be well controlled, status post ventriculoperitoneal shunt for hydrocephalus, vitiligo, GERD and is complaining primarily of some postprandial bloating.   When he is physically active after meals the bloating does not bother him so much but when he is inactive after meals he notices some just uncomfortable abdominal distention there is no associated nausea or vomiting, no diarrhea or constipation and no pain. Labs were reviewed. CBC is essentially normal given his thalassemia trait. There are microcytic hypochromic indices but iron levels checked previously were normal.  Metabolic panel revealed some minor abnormalities. Serum chlorides are 98 and serum calcium was 10.2. Cholesterol is elevated at 267. Triglycerides are mildly elevated at 157. HDL cholesterol was 80, LDL cholesterol is 156. These numbers are significantly elevated from previous. In reviewing the patient's diet he has been eating a lot of processed meats on sandwiches with mayonnaise. Patient was given some guidelines on a heart healthy diet. He is scheduled to undergo a left inguinal hernia repair next week. Review of Systems   Constitutional: Negative. HENT: Negative. Eyes: Negative. Respiratory: Negative. Cardiovascular: Negative. Gastrointestinal: Positive for abdominal distention (Postprandial bloating). Negative for abdominal pain, blood in stool, constipation, diarrhea, nausea and vomiting. Endocrine: Negative. Genitourinary: Negative. Musculoskeletal: Positive for arthralgias (Some left shoulder discomfort occasionally). Skin: Positive for color change (Extensive vitiligo). Allergic/Immunologic: Negative. Neurological: Negative. Current Outpatient Medications on File Prior to Visit   Medication Sig   • lisinopril (ZESTRIL) 30 mg tablet Take 1 tablet (30 mg total) by mouth daily   • Omega-3 Fatty Acids (fish oil) 1,000 mg Take 1 capsule (1,000 mg total) by mouth daily   • omeprazole (PriLOSEC) 40 MG capsule TAKE 1 CAPSULE (40 MG TOTAL) BY MOUTH IN THE MORNING.    • [DISCONTINUED] polyethylene glycol (GaviLyte-G) 4000 mL solution Take 4,000 mL by mouth once for 1 dose (Patient not taking: Reported on 9/2/2022)       Objective     /80 (BP Location: Left arm, Patient Position: Sitting, Cuff Size: Large)   Pulse 77   Temp (!) 97 °F (36.1 °C) (Tympanic)   Wt 102 kg (224 lb 3.2 oz)   SpO2 99%   BMI 32.17 kg/m²     Physical Exam  Vitals reviewed. Constitutional:       General: He is not in acute distress. Appearance: Normal appearance. He is not ill-appearing, toxic-appearing or diaphoretic. HENT:      Head: Normocephalic and atraumatic. Comments:  shunt noted of the right parietal scalp     Right Ear: External ear normal.      Left Ear: External ear normal.   Eyes:      General: No scleral icterus. Conjunctiva/sclera: Conjunctivae normal.      Pupils: Pupils are equal, round, and reactive to light. Neck:      Vascular: No JVD. Trachea: No tracheal deviation. Cardiovascular:      Rate and Rhythm: Normal rate and regular rhythm. Heart sounds: Normal heart sounds. No murmur heard. Pulmonary:      Effort: Pulmonary effort is normal. No respiratory distress. Abdominal:      General: Abdomen is flat. Bowel sounds are normal. There is no distension. Palpations: There is no mass. Tenderness: There is no abdominal tenderness. There is no guarding. Musculoskeletal:         General: No swelling or tenderness. Cervical back: Neck supple. No rigidity. Right lower leg: No edema. Left lower leg: No edema. Skin:     General: Skin is warm. Capillary Refill: Capillary refill takes less than 2 seconds. Coloration: Skin is not jaundiced. Findings: No bruising, erythema or rash. Comments: Extensive vitiligo   Neurological:      General: No focal deficit present. Mental Status: He is alert and oriented to person, place, and time. Mental status is at baseline. Psychiatric:         Mood and Affect: Mood normal.         Behavior: Behavior normal.         Thought Content:  Thought content normal.         Judgment: Judgment normal.       Jodi Jorge MD

## 2023-09-20 NOTE — ASSESSMENT & PLAN NOTE
Previous values.   We discussed heart healthy diet and patient was given heart healthy diet details and we will reevaluate his lipid profile and need for treatment in 6 months

## 2023-09-21 RX ORDER — MULTIVITAMIN
1 TABLET ORAL DAILY
COMMUNITY

## 2023-09-21 RX ORDER — OMEGA-3S/DHA/EPA/FISH OIL/D3 300MG-1000
400 CAPSULE ORAL DAILY
COMMUNITY

## 2023-09-21 NOTE — PRE-PROCEDURE INSTRUCTIONS
Pre-Surgery Instructions:   Medication Instructions   • cholecalciferol (VITAMIN D3) 400 units tablet LD 9/21/23   • lisinopril (ZESTRIL) 30 mg tablet Take night before surgery   • Multiple Vitamin (multivitamin) tablet LD 9/21/23   • Omega-3 Fatty Acids (fish oil) 1,000 mg LD 9/21/23   • simethicone (Gas-X Ultra Strength) 180 MG capsule PRN      Medication instructions for day surgery reviewed. Please use only a sip of water to take your instructed medications. Avoid all over the counter vitamins, supplements and NSAIDS for one week prior to surgery per anesthesia guidelines. Tylenol is ok to take as needed. You will receive a call one business day prior to surgery with an arrival time and hospital directions. If your surgery is scheduled on a Monday, the hospital will be calling you on the Friday prior to your surgery. If you have not heard from anyone by 8pm, please call the hospital supervisor through the hospital  at [phone]. Beatriz Chavez [phone]). Do not eat or drink anything after midnight the night before your surgery, including candy, mints, lifesavers, or chewing gum. Do not drink alcohol 24hrs before your surgery. Try not to smoke at least 24hrs before your surgery. Follow the pre surgery showering instructions as listed in the [facility] Surgical Experience Booklet” or otherwise provided by your surgeon's office. Do not shave the surgical area 24 hours before surgery. Do not apply any lotions, creams, including makeup, cologne, deodorant, or perfumes after showering on the day of your surgery. No contact lenses, eye make-up, or artificial eyelashes. Remove nail polish, including gel polish, and any artificial, gel, or acrylic nails if possible. Remove all jewelry including rings and body piercing jewelry. Wear causal clothing that is easy to take on and off. Consider your type of surgery. Keep any valuables, jewelry, piercings at home.  Please bring any specially ordered equipment (sling, braces) if indicated. Arrange for a responsible person to drive you to and from the hospital on the day of your surgery. Visitor Guidelines discussed. Call the surgeon's office with any new illnesses, exposures, or additional questions prior to surgery. Please reference your [facility] Surgical Experience Booklet” for additional information to prepare for your upcoming surgery.

## 2023-09-24 ENCOUNTER — ANESTHESIA EVENT (OUTPATIENT)
Dept: PERIOP | Facility: HOSPITAL | Age: 63
End: 2023-09-24
Payer: OTHER MISCELLANEOUS

## 2023-09-25 ENCOUNTER — HOSPITAL ENCOUNTER (OUTPATIENT)
Facility: HOSPITAL | Age: 63
Setting detail: OUTPATIENT SURGERY
Discharge: HOME | End: 2023-09-25
Attending: SURGERY | Admitting: SURGERY
Payer: OTHER MISCELLANEOUS

## 2023-09-25 ENCOUNTER — ANESTHESIA (OUTPATIENT)
Dept: PERIOP | Facility: HOSPITAL | Age: 63
End: 2023-09-25
Payer: OTHER MISCELLANEOUS

## 2023-09-25 VITALS
WEIGHT: 224 LBS | TEMPERATURE: 98 F | BODY MASS INDEX: 32.07 KG/M2 | SYSTOLIC BLOOD PRESSURE: 130 MMHG | HEART RATE: 78 BPM | HEIGHT: 70 IN | RESPIRATION RATE: 16 BRPM | DIASTOLIC BLOOD PRESSURE: 66 MMHG | OXYGEN SATURATION: 94 %

## 2023-09-25 DIAGNOSIS — K40.90 LEFT INGUINAL HERNIA: Primary | ICD-10-CM

## 2023-09-25 PROCEDURE — C1781 MESH (IMPLANTABLE): HCPCS | Performed by: SURGERY

## 2023-09-25 PROCEDURE — 93005 ELECTROCARDIOGRAM TRACING: CPT

## 2023-09-25 PROCEDURE — 49505 PRP I/HERN INIT REDUC >5 YR: CPT | Performed by: SURGERY

## 2023-09-25 DEVICE — BARD MESH PRE-SHAPED WITH KEYHOLE
Type: IMPLANTABLE DEVICE | Site: INGUINAL | Status: FUNCTIONAL
Brand: BARD MESH PRE-SHAPED

## 2023-09-25 RX ORDER — ACETAMINOPHEN 500 MG
500 TABLET ORAL EVERY 6 HOURS PRN
COMMUNITY

## 2023-09-25 RX ORDER — ONDANSETRON 2 MG/ML
4 INJECTION INTRAMUSCULAR; INTRAVENOUS ONCE AS NEEDED
Status: DISCONTINUED | OUTPATIENT
Start: 2023-09-25 | End: 2023-09-25 | Stop reason: HOSPADM

## 2023-09-25 RX ORDER — DEXAMETHASONE SODIUM PHOSPHATE 10 MG/ML
INJECTION, SOLUTION INTRAMUSCULAR; INTRAVENOUS AS NEEDED
Status: DISCONTINUED | OUTPATIENT
Start: 2023-09-25 | End: 2023-09-25

## 2023-09-25 RX ORDER — ACETAMINOPHEN 325 MG/1
650 TABLET ORAL ONCE
Status: COMPLETED | OUTPATIENT
Start: 2023-09-25 | End: 2023-09-25

## 2023-09-25 RX ORDER — MIDAZOLAM HYDROCHLORIDE 2 MG/2ML
INJECTION, SOLUTION INTRAMUSCULAR; INTRAVENOUS AS NEEDED
Status: DISCONTINUED | OUTPATIENT
Start: 2023-09-25 | End: 2023-09-25

## 2023-09-25 RX ORDER — SODIUM CHLORIDE, SODIUM LACTATE, POTASSIUM CHLORIDE, CALCIUM CHLORIDE 600; 310; 30; 20 MG/100ML; MG/100ML; MG/100ML; MG/100ML
125 INJECTION, SOLUTION INTRAVENOUS CONTINUOUS
Status: DISCONTINUED | OUTPATIENT
Start: 2023-09-25 | End: 2023-09-25

## 2023-09-25 RX ORDER — HYDROMORPHONE HCL/PF 1 MG/ML
SYRINGE (ML) INJECTION AS NEEDED
Status: DISCONTINUED | OUTPATIENT
Start: 2023-09-25 | End: 2023-09-25

## 2023-09-25 RX ORDER — CEFAZOLIN SODIUM 2 G/50ML
SOLUTION INTRAVENOUS AS NEEDED
Status: DISCONTINUED | OUTPATIENT
Start: 2023-09-25 | End: 2023-09-25

## 2023-09-25 RX ORDER — ONDANSETRON 2 MG/ML
INJECTION INTRAMUSCULAR; INTRAVENOUS AS NEEDED
Status: DISCONTINUED | OUTPATIENT
Start: 2023-09-25 | End: 2023-09-25

## 2023-09-25 RX ORDER — ONDANSETRON 2 MG/ML
4 INJECTION INTRAMUSCULAR; INTRAVENOUS ONCE
Status: COMPLETED | OUTPATIENT
Start: 2023-09-25 | End: 2023-09-25

## 2023-09-25 RX ORDER — OXYCODONE HYDROCHLORIDE 5 MG/1
5 TABLET ORAL EVERY 4 HOURS PRN
Qty: 20 TABLET | Refills: 0 | Status: SHIPPED | OUTPATIENT
Start: 2023-09-25 | End: 2023-10-05

## 2023-09-25 RX ORDER — EPHEDRINE SULFATE 50 MG/ML
INJECTION INTRAVENOUS AS NEEDED
Status: DISCONTINUED | OUTPATIENT
Start: 2023-09-25 | End: 2023-09-25

## 2023-09-25 RX ORDER — KETOROLAC TROMETHAMINE 30 MG/ML
INJECTION, SOLUTION INTRAMUSCULAR; INTRAVENOUS AS NEEDED
Status: DISCONTINUED | OUTPATIENT
Start: 2023-09-25 | End: 2023-09-25

## 2023-09-25 RX ORDER — FENTANYL CITRATE 50 UG/ML
INJECTION, SOLUTION INTRAMUSCULAR; INTRAVENOUS AS NEEDED
Status: DISCONTINUED | OUTPATIENT
Start: 2023-09-25 | End: 2023-09-25

## 2023-09-25 RX ORDER — MAGNESIUM HYDROXIDE 1200 MG/15ML
LIQUID ORAL AS NEEDED
Status: DISCONTINUED | OUTPATIENT
Start: 2023-09-25 | End: 2023-09-25 | Stop reason: HOSPADM

## 2023-09-25 RX ORDER — ONDANSETRON 2 MG/ML
4 INJECTION INTRAMUSCULAR; INTRAVENOUS EVERY 4 HOURS PRN
Status: DISCONTINUED | OUTPATIENT
Start: 2023-09-25 | End: 2023-09-26 | Stop reason: HOSPADM

## 2023-09-25 RX ORDER — PROPOFOL 10 MG/ML
INJECTION, EMULSION INTRAVENOUS AS NEEDED
Status: DISCONTINUED | OUTPATIENT
Start: 2023-09-25 | End: 2023-09-25

## 2023-09-25 RX ORDER — LIDOCAINE HYDROCHLORIDE 10 MG/ML
INJECTION, SOLUTION EPIDURAL; INFILTRATION; INTRACAUDAL; PERINEURAL AS NEEDED
Status: DISCONTINUED | OUTPATIENT
Start: 2023-09-25 | End: 2023-09-25

## 2023-09-25 RX ORDER — CHLORHEXIDINE GLUCONATE 4 G/100ML
SOLUTION TOPICAL DAILY PRN
Status: DISCONTINUED | OUTPATIENT
Start: 2023-09-25 | End: 2023-09-26 | Stop reason: HOSPADM

## 2023-09-25 RX ORDER — CEFAZOLIN SODIUM 2 G/50ML
2000 SOLUTION INTRAVENOUS ONCE
Status: COMPLETED | OUTPATIENT
Start: 2023-09-25 | End: 2023-09-25

## 2023-09-25 RX ORDER — SODIUM CHLORIDE, SODIUM LACTATE, POTASSIUM CHLORIDE, CALCIUM CHLORIDE 600; 310; 30; 20 MG/100ML; MG/100ML; MG/100ML; MG/100ML
125 INJECTION, SOLUTION INTRAVENOUS CONTINUOUS
Status: DISCONTINUED | OUTPATIENT
Start: 2023-09-25 | End: 2023-09-26 | Stop reason: HOSPADM

## 2023-09-25 RX ORDER — FENTANYL CITRATE/PF 50 MCG/ML
25 SYRINGE (ML) INJECTION
Status: DISCONTINUED | OUTPATIENT
Start: 2023-09-25 | End: 2023-09-25 | Stop reason: HOSPADM

## 2023-09-25 RX ORDER — HYDROMORPHONE HCL IN WATER/PF 6 MG/30 ML
0.2 PATIENT CONTROLLED ANALGESIA SYRINGE INTRAVENOUS
Status: DISCONTINUED | OUTPATIENT
Start: 2023-09-25 | End: 2023-09-25 | Stop reason: HOSPADM

## 2023-09-25 RX ADMIN — FENTANYL CITRATE 25 MCG: 50 INJECTION, SOLUTION INTRAMUSCULAR; INTRAVENOUS at 15:18

## 2023-09-25 RX ADMIN — EPHEDRINE SULFATE 10 MG: 50 INJECTION INTRAVENOUS at 15:33

## 2023-09-25 RX ADMIN — SODIUM CHLORIDE, SODIUM LACTATE, POTASSIUM CHLORIDE, AND CALCIUM CHLORIDE 125 ML/HR: .6; .31; .03; .02 INJECTION, SOLUTION INTRAVENOUS at 20:38

## 2023-09-25 RX ADMIN — KETOROLAC TROMETHAMINE 30 MG: 30 INJECTION, SOLUTION INTRAMUSCULAR at 15:43

## 2023-09-25 RX ADMIN — DEXAMETHASONE SODIUM PHOSPHATE 10 MG: 10 INJECTION, SOLUTION INTRAMUSCULAR; INTRAVENOUS at 14:12

## 2023-09-25 RX ADMIN — HYDROMORPHONE HYDROCHLORIDE 0.5 MG: 1 INJECTION, SOLUTION INTRAMUSCULAR; INTRAVENOUS; SUBCUTANEOUS at 15:44

## 2023-09-25 RX ADMIN — LIDOCAINE HYDROCHLORIDE 50 MG: 10 INJECTION, SOLUTION EPIDURAL; INFILTRATION; INTRACAUDAL; PERINEURAL at 14:12

## 2023-09-25 RX ADMIN — ACETAMINOPHEN 650 MG: 325 TABLET, FILM COATED ORAL at 16:46

## 2023-09-25 RX ADMIN — PROPOFOL 200 MG: 10 INJECTION, EMULSION INTRAVENOUS at 14:12

## 2023-09-25 RX ADMIN — ONDANSETRON 4 MG: 2 INJECTION INTRAMUSCULAR; INTRAVENOUS at 20:35

## 2023-09-25 RX ADMIN — SODIUM CHLORIDE, SODIUM LACTATE, POTASSIUM CHLORIDE, AND CALCIUM CHLORIDE 125 ML/HR: .6; .31; .03; .02 INJECTION, SOLUTION INTRAVENOUS at 13:34

## 2023-09-25 RX ADMIN — CEFAZOLIN SODIUM 2000 MG: 2 SOLUTION INTRAVENOUS at 14:15

## 2023-09-25 RX ADMIN — SODIUM CHLORIDE, SODIUM LACTATE, POTASSIUM CHLORIDE, AND CALCIUM CHLORIDE 125 ML/HR: .6; .31; .03; .02 INJECTION, SOLUTION INTRAVENOUS at 16:02

## 2023-09-25 RX ADMIN — MIDAZOLAM 2 MG: 1 INJECTION INTRAMUSCULAR; INTRAVENOUS at 14:04

## 2023-09-25 RX ADMIN — FENTANYL CITRATE 25 MCG: 50 INJECTION, SOLUTION INTRAMUSCULAR; INTRAVENOUS at 14:33

## 2023-09-25 RX ADMIN — ONDANSETRON 4 MG: 2 INJECTION INTRAMUSCULAR; INTRAVENOUS at 14:12

## 2023-09-25 RX ADMIN — FENTANYL CITRATE 25 MCG: 50 INJECTION, SOLUTION INTRAMUSCULAR; INTRAVENOUS at 14:12

## 2023-09-25 RX ADMIN — FENTANYL CITRATE 25 MCG: 50 INJECTION, SOLUTION INTRAMUSCULAR; INTRAVENOUS at 14:42

## 2023-09-25 RX ADMIN — CEFAZOLIN SODIUM 2000 MG: 2 SOLUTION INTRAVENOUS at 20:38

## 2023-09-25 NOTE — OP NOTE
OPERATIVE REPORT  PATIENT NAME: Denise Tran    :  1960  MRN: [id number]  Pt Location:  OR ROOM 05    SURGERY DATE: 2023    Surgeon(s) and Role:     * Lenadra Fernandez MD - Primary     * Hill Bauman MD - Assisting     * Jose Antonio Blanca MD - Assisting    Preop Diagnosis:  Left inguinal hernia [K40.90]    Post-Op Diagnosis Codes:     * Left inguinal hernia [K40.90]    Procedure(s):  Left - OPEN LEFT INGUINAL HERNIA REPAIR  WITH MESH    Specimen(s):  * No specimens in log *    Estimated Blood Loss:   Minimal    Drains:  * No LDAs found *    Anesthesia Type:   General    Operative Indications:  Left inguinal hernia [K40.90]    Operative Findings:  Large direct inguinal hernia    Complications:   None    Procedure and Technique:  After informed consent was obtained explaining the risks/benefits/alternatives of the procedure, the patient was taken to the OR. General anesthesia was induced and the patient was prepped and draped in the usual sterile fashion. A time out was performed to verify correct site and procedure.     Local anesthetic was injected. An oblique incision was made in the right inguinal region.  Dissection was carried down to the external oblique aponeurosis using Bovie electrocautery.  The internal ring was opened and extended in the direction of the external oblique fibers.  The ilioinguinal nerve was identified and protected.  The cord was circumferentially dissected free at the level of the pubic tubercle and encircled with a Penrose.  The cremasterics were divided and the hernia sac freed. Kemal Schmidt was a large direct hernia noted.  Once the sac had been completely freed, it was reduced back into the abdomen.  The inguinal floor was recreated by tacking muscle down to shelving edge.   An appropriately sized mesh was selected and securely tacked to pubic tubercle leaving some overlap to provide excellent coverage medially.  The superior part of the mesh was secured to muscle using 2-0 prolene suture.  There was no excessive tension following mesh placement.  The internal ring was recreated by securing both tails of the mesh to each other, taking care not to entrap the spermatic cord.  Hemostasis was ensured throughout.  The penrose drain was removed.  Additional local anesthetic was injected. The external oblique aponeurosis was re-approximated using 2-0 PDS suture.  Jelena's fascia was closed using buried 3-0 vicryl interrupted sutures. Skin was closed using 4-0 monocryl suture in running subcuticular fashion. Skin glue was applied atop the incision.     The patient was awakened and taken to the PACU without any immediate post op complications. Dr. Katerina Cortez was present for the entire procedure.     Patient Disposition:  PACU         SIGNATURE: Lisa Agudelo MD  DATE: September 25, 2023  TIME: 3:54 PM

## 2023-09-25 NOTE — ANESTHESIA PREPROCEDURE EVALUATION
Procedure:  OPEN LEFT INGUINAL HERNIA REPAIR  WITH MESH (Left: Groin)    Relevant Problems   ANESTHESIA (within normal limits)      CARDIO   (+) Essential hypertension      ENDO (within normal limits)      GI/HEPATIC   (+) Gastroesophageal reflux disease without esophagitis   (+) Other dysphagia      /RENAL (within normal limits)      HEMATOLOGY   (+) Thalassemia trait      MUSCULOSKELETAL (within normal limits)      NEURO/PSYCH (within normal limits)      PULMONARY (within normal limits)        Physical Exam    Airway    Mallampati score: I  TM Distance: >3 FB  Neck ROM: full     Dental   No notable dental hx     Cardiovascular  Rhythm: regular, Rate: normal,     Pulmonary  Breath sounds clear to auscultation,     Other Findings        Anesthesia Plan  ASA Score- 2     Anesthesia Type- general with ASA Monitors. Additional Monitors:   Airway Plan: LMA. Plan Factors-Exercise tolerance (METS): >4 METS. Chart reviewed. EKG reviewed. Imaging results reviewed. Existing labs reviewed. Patient summary reviewed. Patient is not a current smoker. Patient not instructed to abstain from smoking on day of procedure. Induction- intravenous. Postoperative Plan- Plan for postoperative opioid use. Planned trial extubation    Informed Consent- Anesthetic plan and risks discussed with patient. I personally reviewed this patient with the CRNA. Discussed and agreed on the Anesthesia Plan with the CRNA. Juanjose Ordonez

## 2023-09-25 NOTE — DISCHARGE INSTR - AVS FIRST PAGE
INGUINAL HERNIA REPAIR WITH MESH  Discharge Instructions:    1. General: You will feel pulling sensations around the wound or funny aches and pains around the incisions. This is normal. Even minor surgery is a change in your body and this is your body’s way of reacting to it. If you have had abdominal surgery, it may help to support the incision with a small pillow or blanket for comfort when moving or coughing. 2. Wound care: It is okay to shower (see below). You do not need a dressing over the wound unless you feel more comfortable doing so. The skin glue will fall off on its own over the next 1-2 weeks. 3. Water: You may shower over the wound, unless there are drain tubes left in place. Do not vigorously scrub the incision. Do not bathe or use a pool or hot tub until cleared by the physician. 4. Activity: You may go up and down stairs, walk as much as you are comfortable, but walk at least 3 times each day. If you have had abdominal surgery, do not lift anything heavier than 20 pounds for at least 4 weeks, unless cleared by the doctor. 5. Diet: You may resume a regular diet. If you had a same-day surgery or overnight stay surgery, you may wish to eat lightly for a few days: soups, crackers, and sandwiches. You may resume a regular diet when ready. 6. Medications: Resume all of your previous medications, unless told otherwise by the doctor. A good option for pain control is to start with Tylenol and ibuprofen and alternate taking them every 2 hours for 1-2 days. If this is not sufficient then substituted the Tylenol for the narcotic pain medicine prescribed. Do not take more than 4000 mg of Tylenol per day. You do not need to take the narcotic pain medications unless you are having significant pain and discomfort. 7. Driving: You will need someone to drive you home on the day of surgery.  Do not drive or make any important decisions while on narcotic pain medication or 24 hours and after anesthesia or sedation for surgery. Generally, you may drive when your off all narcotic pain medications. 8. Upset Stomach: You may take Maalox, Tums, or similar items for an upset stomach. If your narcotic pain medication causes an upset stomach, do not take it on an empty stomach. Try taking it with at least some crackers or toast.      9. Constipation: Patients often experienced constipation after surgery. You may take over-the-counter medication for this, such as Metamucil, Senokot, Dulcolax, milk of magnesia, etc. You may take a suppository unless you have had anorectal surgery such as a procedure on your hemorrhoids. If you experience significant nausea or vomiting after abdominal surgery, call the office before trying any of these medications. 10. Call the office: If you are experiencing any of the following, fevers above 101.5°, significant nausea or vomiting, if the wound develops drainage and/or is excessive redness around the wound, or if you have significant diarrhea or other worsening symptoms. 11. Pain: You may be given a prescription for pain. This will be given to the hospital, the day of surgery.

## 2023-09-25 NOTE — INTERVAL H&P NOTE
H&P reviewed. After examining the patient I find no changes in the patients condition since the H&P had been written.     Vitals:    09/25/23 1316   BP: 128/87   Pulse: 76   Resp: 20   Temp: 97.5 °F (36.4 °C)   SpO2: 98%

## 2023-09-26 LAB
ATRIAL RATE: 89 BPM
P AXIS: 60 DEGREES
PR INTERVAL: 210 MS
QRS AXIS: -7 DEGREES
QRSD INTERVAL: 84 MS
QT INTERVAL: 374 MS
QTC INTERVAL: 455 MS
T WAVE AXIS: 10 DEGREES
VENTRICULAR RATE: 89 BPM

## 2023-09-26 PROCEDURE — 93010 ELECTROCARDIOGRAM REPORT: CPT | Performed by: INTERNAL MEDICINE

## 2023-09-26 NOTE — PROGRESS NOTES
Patient was given discharge instructions and had time to ask questions. Pt is adequate for discharge.

## 2023-09-26 NOTE — PROGRESS NOTES
Pt unable to void post-op. Red surgery resident made aware and will overflow to medsur bed.   Report given to [facility]

## 2023-10-09 ENCOUNTER — OFFICE VISIT (OUTPATIENT)
Dept: SURGERY | Facility: CLINIC | Age: 63
End: 2023-10-09

## 2023-10-09 VITALS — BODY MASS INDEX: 32.05 KG/M2 | WEIGHT: 223.9 LBS | TEMPERATURE: 97.7 F | HEIGHT: 70 IN

## 2023-10-09 DIAGNOSIS — K40.90 LEFT INGUINAL HERNIA: Primary | ICD-10-CM

## 2023-10-09 PROCEDURE — 99024 POSTOP FOLLOW-UP VISIT: CPT | Performed by: STUDENT IN AN ORGANIZED HEALTH CARE EDUCATION/TRAINING PROGRAM

## 2023-10-09 NOTE — ASSESSMENT & PLAN NOTE
S/p left inguinal hernia on 9/25. Doing well and meeting post-operative expectations.     -no heavy lifting for additional 4 weeks  -f/u PRN  -will call office for work note in 4 weeks from today

## 2023-10-09 NOTE — PROGRESS NOTES
Office Visit - General Surgery  Lele Guo MRN: [id number]  Encounter: [id number]    Assessment and Plan  Problem List Items Addressed This Visit        Other    Left inguinal hernia - Primary     S/p left inguinal hernia on 9/25. Doing well and meeting post-operative expectations. -no heavy lifting for additional 4 weeks  -f/u PRN  -will call office for work note in 4 weeks from today            Chief Complaint:  Lele Guo is a 63 y.o. male who presents for Post-op (P/o left inguinal hernia repair.)    Subjective  No complaints. Doing well. No nausea, vomiting, fevers, chills, chest pain, shortness of breath. Tolerating diet. Ambulating. Voiding without difficulty. Progressing as expected. Past Medical History:   Diagnosis Date   • Allergic    • Allergic rhinitis    • Altered mental status 04/08/2022   • Deviated septum    • GERD (gastroesophageal reflux disease)    • Hydrocephalus (HCC)    • Hypertension    • Obesity (BMI 30.0-34.9) 04/11/2018   • Thalassemia 10/25/2017       Past Surgical History:   Procedure Laterality Date   • GA COLONOSCOPY FLX DX W/COLLJ SPEC WHEN PFRMD N/A 2/8/2017    Procedure: COLONOSCOPY;  Surgeon: Lenin Turner MD;  Location: [facility] GI LAB;   Service: Gastroenterology   • GA RPR RECRT INGUINAL HERNIA ANY AGE REDUCIBLE Left 9/25/2023    Procedure: OPEN LEFT INGUINAL HERNIA REPAIR  WITH MESH;  Surgeon: Nisreen Madrigal MD;  Location: [facility] OR;  Service: General   • SHOULDER SURGERY      last assessed 3/8/2016   • VENTRICULAR ATRIAL SHUNT      for hydrocephalus   • VENTRICULOPERITONEAL SHUNT     • VENTRICULOPERITONEAL SHUNT Right 03/25/2022    Revision and replacement of ventriculoperitoneal shunt       Family History   Problem Relation Age of Onset   • Diabetes Mother         DM   • Diabetes Father         DM   • Hypertension Father    • Diabetes Brother         DM   • Heart disease Brother         problem   • Diabetes type II Brother    • Cirrhosis Brother    • Diabetes type II Brother    • Dialysis Brother    • Thrombocytopenia Daughter    • Other Daughter         ITP   • Clotting disorder Maternal Uncle         TTP   • Other Other         TTP   • Liver disease Family         sibling   • Lung cancer Family         Uncle       Social History     Tobacco Use   • Smoking status: Never   • Smokeless tobacco: Never   Vaping Use   • Vaping Use: Never used   Substance Use Topics   • Alcohol use: Yes     Comment: rare occassion   • Drug use: No        Medications  Current Outpatient Medications on File Prior to Visit   Medication Sig Dispense Refill   • acetaminophen (TYLENOL) 500 mg tablet Take 500 mg by mouth every 6 (six) hours as needed for mild pain     • cholecalciferol (VITAMIN D3) 400 units tablet Take 400 Units by mouth daily     • lisinopril (ZESTRIL) 30 mg tablet Take 1 tablet (30 mg total) by mouth daily 30 tablet 5   • Multiple Vitamin (multivitamin) tablet Take 1 tablet by mouth daily     • Omega-3 Fatty Acids (fish oil) 1,000 mg Take 1 capsule (1,000 mg total) by mouth daily 90 capsule 1   • simethicone (Gas-X Ultra Strength) 180 MG capsule 2-3 tablets with meals 120 capsule 0   • omeprazole (PriLOSEC) 40 MG capsule TAKE 1 CAPSULE (40 MG TOTAL) BY MOUTH IN THE MORNING. 90 capsule 0   • [DISCONTINUED] polyethylene glycol (GaviLyte-G) 4000 mL solution Take 4,000 mL by mouth once for 1 dose (Patient not taking: Reported on 9/2/2022) 4000 mL 0     No current facility-administered medications on file prior to visit. Allergies  Allergies   Allergen Reactions   • Pollen Extract        Review of Systems   See above, otherwise negative. Objective  Vitals:    10/09/23 1101   Temp: 97.7 °F (36.5 °C)       Physical Exam  Constitutional:       Appearance: Normal appearance. HENT:      Head: Normocephalic and atraumatic. Mouth/Throat:      Mouth: Mucous membranes are moist.      Pharynx: Oropharynx is clear.    Eyes:      Extraocular Movements: Extraocular movements intact. Pupils: Pupils are equal, round, and reactive to light. Cardiovascular:      Rate and Rhythm: Normal rate and regular rhythm. Pulses: Normal pulses. Heart sounds: Normal heart sounds. Pulmonary:      Effort: Pulmonary effort is normal.      Breath sounds: Normal breath sounds. Abdominal:      General: Abdomen is flat. There is no distension. Tenderness: There is no abdominal tenderness. There is no guarding. Comments: Left inguinal hernia repair incisions clean/dry/intact. No erythema. Minimal swelling appreciated. Musculoskeletal:         General: Normal range of motion. Cervical back: Normal range of motion and neck supple. Skin:     General: Skin is warm and dry. Neurological:      General: No focal deficit present. Mental Status: He is alert and oriented to person, place, and time.    Psychiatric:         Mood and Affect: Mood normal.         Behavior: Behavior normal.

## 2023-10-12 DIAGNOSIS — K21.9 GASTROESOPHAGEAL REFLUX DISEASE WITHOUT ESOPHAGITIS: ICD-10-CM

## 2023-10-12 RX ORDER — OMEPRAZOLE 40 MG/1
40 CAPSULE, DELAYED RELEASE ORAL DAILY
Qty: 30 CAPSULE | Refills: 2 | Status: SHIPPED | OUTPATIENT
Start: 2023-10-12 | End: 2023-11-11

## 2023-11-06 DIAGNOSIS — K21.9 GASTROESOPHAGEAL REFLUX DISEASE WITHOUT ESOPHAGITIS: ICD-10-CM

## 2023-11-06 RX ORDER — OMEPRAZOLE 40 MG/1
40 CAPSULE, DELAYED RELEASE ORAL DAILY
Qty: 90 CAPSULE | Refills: 1 | Status: SHIPPED | OUTPATIENT
Start: 2023-11-06 | End: 2023-12-06

## 2023-11-13 ENCOUNTER — OFFICE VISIT (OUTPATIENT)
Dept: SURGERY | Facility: CLINIC | Age: 63
End: 2023-11-13

## 2023-11-13 VITALS — WEIGHT: 221.6 LBS | HEIGHT: 70 IN | BODY MASS INDEX: 31.73 KG/M2 | TEMPERATURE: 97.3 F

## 2023-11-13 DIAGNOSIS — K40.90 LEFT INGUINAL HERNIA: Primary | ICD-10-CM

## 2023-11-13 NOTE — PROGRESS NOTES
Office Visit - General Surgery  Darek Hall MRN: [id number]  Encounter: [id number]    Assessment and Plan  Problem List Items Addressed This Visit    None      Chief Complaint:  Darek Hall is a 63 y.o. male who presents for Post-op (P/o incisional hernia repair.)    Subjective      Past Medical History:   Diagnosis Date    Allergic     Allergic rhinitis     Altered mental status 04/08/2022    Deviated septum     GERD (gastroesophageal reflux disease)     Hydrocephalus ([address])     Hypertension     Obesity (BMI 30.0-34.9) 04/11/2018    Thalassemia 10/25/2017       Past Surgical History:   Procedure Laterality Date    LA COLONOSCOPY FLX DX W/COLLJ SPEC WHEN PFRMD N/A 2/8/2017    Procedure: COLONOSCOPY;  Surgeon: Rocael Santoyo MD;  Location: [facility];   Service: Gastroenterology    LA RPR RECRT INGUINAL HERNIA ANY AGE REDUCIBLE Left 9/25/2023    Procedure: OPEN LEFT INGUINAL HERNIA REPAIR  WITH MESH;  Surgeon: Lexus Currie MD;  Location: [facility];  Service: General    SHOULDER SURGERY      last assessed 3/8/2016    VENTRICULAR ATRIAL SHUNT      for hydrocephalus    VENTRICULOPERITONEAL SHUNT      VENTRICULOPERITONEAL SHUNT Right 03/25/2022    Revision and replacement of ventriculoperitoneal shunt       Family History   Problem Relation Age of Onset    Diabetes Mother         DM    Diabetes Father         DM    Hypertension Father     Diabetes Brother         DM    Heart disease Brother         problem    Diabetes type II Brother     Cirrhosis Brother     Diabetes type II Brother     Dialysis Brother     Thrombocytopenia Daughter     Other Daughter         ITP    Clotting disorder Maternal Uncle         TTP    Other Other         TTP    Liver disease Family         sibling    Lung cancer Family         Uncle       Social History     Tobacco Use    Smoking status: Never    Smokeless tobacco: Never   Vaping Use    Vaping Use: Never used   Substance Use Topics    Alcohol use: Yes     Comment: rare occassion Drug use: No        Medications  Current Outpatient Medications on File Prior to Visit   Medication Sig Dispense Refill    acetaminophen (TYLENOL) 500 mg tablet Take 500 mg by mouth every 6 (six) hours as needed for mild pain      cholecalciferol (VITAMIN D3) 400 units tablet Take 400 Units by mouth daily      lisinopril (ZESTRIL) 30 mg tablet Take 1 tablet (30 mg total) by mouth daily 30 tablet 5    Multiple Vitamin (multivitamin) tablet Take 1 tablet by mouth daily      Omega-3 Fatty Acids (fish oil) 1,000 mg Take 1 capsule (1,000 mg total) by mouth daily 90 capsule 1    omeprazole (PriLOSEC) 40 MG capsule TAKE 1 CAPSULE (40 MG TOTAL) BY MOUTH IN THE MORNING 90 capsule 1    simethicone (Gas-X Ultra Strength) 180 MG capsule 2-3 tablets with meals 120 capsule 0    [DISCONTINUED] polyethylene glycol (GaviLyte-G) 4000 mL solution Take 4,000 mL by mouth once for 1 dose (Patient not taking: Reported on 9/2/2022) 4000 mL 0     No current facility-administered medications on file prior to visit.        Allergies  Allergies   Allergen Reactions    Pollen Extract        Review of Systems    Objective  Vitals:    11/13/23 0843   Temp: (!) 97.3 °F (36.3 °C)       Physical Exam

## 2023-11-13 NOTE — PROGRESS NOTES
Assessment/Plan:    Pt is 62 yo M presenting for post-op check s/p L inguinal hernia repair with mesh on 9/25. No masses seen, tenderness to palpation present in L groin. No concern for integrity of repair at this time, agree with pt he can take additional time off before returning to work full time. No problem-specific Assessment & Plan notes found for this encounter. There are no diagnoses linked to this encounter. Plan    -Can provide note for additional 2 weeks off before resuming work  -prn Tylenol and motrin for pain relief  -Advised pt to approach lifting and moving with caution, will have close supervision upon return  -Pt advised to call our office if pain does not go away, worsens, or any masses c/f hernia recurrence appear    Subjective:      Patient ID: Irving Reeder is a 63 y.o. male who presents for f/u of his L inguinal hernia repair in September (9/25/23). Pt has been recovering well from surgery about 8 weeks ago, although about 1 week ago he was helping his wife move some items around the house and felt some discomfort in his L groin when he went to move some pots and pans weighing less than 30 lbs. 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 Pt has been recovering well from surgery about 8 weeks ago, although 1 week ago he was helping his wife move some items around the house and felt some discomfort in his L groin when he went to move some pots and pans weighing less than 30 lbs. 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        The following portions of the patient's history were reviewed and updated as appropriate: allergies, current medications, past family history, past medical history, past social history, past surgical history, and problem list.    Review of Systems   Constitutional: Negative. HENT: Negative. Eyes: Negative. Respiratory: Negative. Cardiovascular: Negative. Gastrointestinal:  Negative for abdominal distention and abdominal pain. Endocrine: Negative. Genitourinary:  Negative for difficulty urinating, penile pain and penile swelling. Musculoskeletal: Negative. Skin: Negative. Allergic/Immunologic: Negative. Neurological: Negative. Hematological: Negative. Psychiatric/Behavioral: Negative. Objective:      Temp (!) 97.3 °F (36.3 °C) (Temporal)   Ht 5' 10" (1.778 m)   Wt 101 kg (221 lb 9.6 oz)   BMI 31.80 kg/m²          Physical Exam  Constitutional:       Appearance: Normal appearance. HENT:      Head: Normocephalic and atraumatic. Nose: Nose normal.   Eyes:      Extraocular Movements: Extraocular movements intact. Cardiovascular:      Rate and Rhythm: Normal rate and regular rhythm. Pulses: Normal pulses. Pulmonary:      Effort: Pulmonary effort is normal.   Abdominal:      General: Abdomen is flat. Palpations: Abdomen is soft. Genitourinary:     Penis: Normal.       Testes: Normal.   Musculoskeletal:         General: Tenderness present. Cervical back: Normal range of motion. Comments: L groin, tender to palpation. No defect noted or masses seen in inguinal region. Skin:     General: Skin is warm and dry. Neurological:      General: No focal deficit present. Mental Status: He is alert.    Psychiatric:         Mood and Affect: Mood normal.         Behavior: Behavior normal.

## 2023-11-13 NOTE — LETTER
November 13, 2023     Patient: Hemalatha Burroughs  YOB: 1960  Date of Visit: 11/13/2023      To Whom it May Concern:    Tom Burger is under my professional care. Shiv Woodward was seen in my office on 11/13/2023. Shiv Woodward may return to work on 11/27/23. He should avoid lifting over 60 pounds if possible. Work accommodations should be made as able to avoid heavy lifting. If you have any questions or concerns, please don't hesitate to call.          Sincerely,          [facility] Phys[facility]        CC: No Recipients

## 2024-01-18 ENCOUNTER — APPOINTMENT (OUTPATIENT)
Dept: URGENT CARE | Age: 64
End: 2024-01-18
Payer: OTHER MISCELLANEOUS

## 2024-01-18 PROCEDURE — G0384 LEV 5 HOSP TYPE B ED VISIT: HCPCS | Performed by: STUDENT IN AN ORGANIZED HEALTH CARE EDUCATION/TRAINING PROGRAM

## 2024-01-18 PROCEDURE — 99285 EMERGENCY DEPT VISIT HI MDM: CPT | Performed by: STUDENT IN AN ORGANIZED HEALTH CARE EDUCATION/TRAINING PROGRAM

## 2024-01-22 ENCOUNTER — APPOINTMENT (OUTPATIENT)
Dept: URGENT CARE | Age: 64
End: 2024-01-22
Payer: OTHER MISCELLANEOUS

## 2024-01-22 PROCEDURE — 99214 OFFICE O/P EST MOD 30 MIN: CPT | Performed by: PHYSICIAN ASSISTANT

## 2024-01-29 ENCOUNTER — APPOINTMENT (OUTPATIENT)
Dept: URGENT CARE | Age: 64
End: 2024-01-29
Payer: OTHER MISCELLANEOUS

## 2024-01-29 PROCEDURE — 99213 OFFICE O/P EST LOW 20 MIN: CPT | Performed by: PHYSICIAN ASSISTANT

## 2024-02-12 ENCOUNTER — APPOINTMENT (OUTPATIENT)
Dept: URGENT CARE | Age: 64
End: 2024-02-12
Payer: OTHER MISCELLANEOUS

## 2024-02-12 PROCEDURE — 99214 OFFICE O/P EST MOD 30 MIN: CPT | Performed by: PHYSICIAN ASSISTANT

## 2024-02-20 DIAGNOSIS — I10 ESSENTIAL HYPERTENSION: ICD-10-CM

## 2024-02-20 RX ORDER — LISINOPRIL 30 MG/1
30 TABLET ORAL DAILY
Qty: 30 TABLET | Refills: 5 | Status: SHIPPED | OUTPATIENT
Start: 2024-02-20

## 2024-02-23 ENCOUNTER — APPOINTMENT (OUTPATIENT)
Dept: URGENT CARE | Age: 64
End: 2024-02-23
Payer: OTHER MISCELLANEOUS

## 2024-02-23 PROCEDURE — 99213 OFFICE O/P EST LOW 20 MIN: CPT | Performed by: PHYSICIAN ASSISTANT

## 2024-03-01 ENCOUNTER — APPOINTMENT (OUTPATIENT)
Dept: URGENT CARE | Age: 64
End: 2024-03-01
Payer: OTHER MISCELLANEOUS

## 2024-03-01 PROCEDURE — 99213 OFFICE O/P EST LOW 20 MIN: CPT | Performed by: PHYSICIAN ASSISTANT

## 2024-03-13 LAB
ALBUMIN SERPL-MCNC: 4.6 G/DL (ref 3.5–5.7)
ALP SERPL-CCNC: 44 U/L (ref 35–120)
ALT SERPL-CCNC: 23 U/L
ANION GAP SERPL CALCULATED.3IONS-SCNC: 8 MMOL/L (ref 3–11)
AST SERPL-CCNC: 22 U/L
BASOPHILS # BLD AUTO: 0 THOU/CMM (ref 0–0.1)
BASOPHILS NFR BLD AUTO: 1 %
BILIRUB SERPL-MCNC: 0.8 MG/DL (ref 0.2–1)
BUN SERPL-MCNC: 14 MG/DL (ref 7–28)
BURR CELLS BLD QL SMEAR: ABNORMAL
CALCIUM SERPL-MCNC: 9.9 MG/DL (ref 8.5–10.1)
CHLORIDE SERPL-SCNC: 100 MMOL/L (ref 100–109)
CHOLEST SERPL-MCNC: 242 MG/DL
CHOLEST/HDLC SERPL: 3.8 {RATIO}
CO2 SERPL-SCNC: 28 MMOL/L (ref 21–31)
CREAT SERPL-MCNC: 0.95 MG/DL (ref 0.53–1.3)
CYTOLOGY CMNT CVX/VAG CYTO-IMP: NORMAL
DIFFERENTIAL METHOD BLD: ABNORMAL
EOSINOPHIL # BLD AUTO: 0.2 THOU/CMM (ref 0–0.5)
EOSINOPHIL NFR BLD AUTO: 5 %
ERYTHROCYTE [DISTWIDTH] IN BLOOD BY AUTOMATED COUNT: 15.9 % (ref 12–16)
GFR/BSA.PRED SERPLBLD CYS-BASED-ARV: 91 ML/MIN/{1.73_M2}
GLUCOSE SERPL-MCNC: 87 MG/DL (ref 65–99)
HCT VFR BLD AUTO: 41.1 % (ref 37–48)
HDLC SERPL-MCNC: 63 MG/DL (ref 23–92)
HGB BLD-MCNC: 13.3 G/DL (ref 12.5–17)
LDLC SERPL CALC-MCNC: 159 MG/DL
LG PLATELETS BLD QL SMEAR: ABNORMAL
LYMPHOCYTES # BLD AUTO: 2.1 THOU/CMM (ref 1–3)
LYMPHOCYTES NFR BLD AUTO: 41 %
MACROCYTES BLD QL SMEAR: ABNORMAL
MAGNESIUM SERPL-MCNC: 2 MG/DL (ref 1.4–2.2)
MCH RBC QN AUTO: 21.6 PG (ref 27–36)
MCHC RBC AUTO-ENTMCNC: 32.3 G/DL (ref 32–37)
MCV RBC AUTO: 67 FL (ref 80–100)
MICROCYTES BLD QL SMEAR: SLIGHT
MONOCYTES # BLD AUTO: 0.5 THOU/CMM (ref 0.3–1)
MONOCYTES NFR BLD AUTO: 11 %
MORPHOLOGY BLD-IMP: ABNORMAL
NEUTROPHILS # BLD AUTO: 2.1 THOU/CMM (ref 1.8–7.8)
NEUTROPHILS NFR BLD AUTO: 42 %
NONHDLC SERPL-MCNC: 179 MG/DL
OVALOCYTES BLD QL SMEAR: SLIGHT
PLATELET # BLD AUTO: 167 THOU/CMM (ref 140–350)
PMV BLD REES-ECKER: 10.6 FL (ref 7.5–11.3)
POLYCHROMASIA BLD QL SMEAR: ABNORMAL
POTASSIUM SERPL-SCNC: 5.1 MMOL/L (ref 3.5–5.2)
PROT SERPL-MCNC: 6.7 G/DL (ref 6.3–8.3)
RBC # BLD AUTO: 6.16 MILL/CMM (ref 4–5.4)
SODIUM SERPL-SCNC: 136 MMOL/L (ref 135–145)
TRIGL SERPL-MCNC: 101 MG/DL
WBC # BLD AUTO: 5 THOU/CMM (ref 4–10.5)

## 2024-03-20 ENCOUNTER — OFFICE VISIT (OUTPATIENT)
Age: 64
End: 2024-03-20
Payer: COMMERCIAL

## 2024-03-20 VITALS
TEMPERATURE: 96.7 F | DIASTOLIC BLOOD PRESSURE: 76 MMHG | HEIGHT: 71 IN | OXYGEN SATURATION: 98 % | HEART RATE: 67 BPM | SYSTOLIC BLOOD PRESSURE: 110 MMHG | BODY MASS INDEX: 32.4 KG/M2 | WEIGHT: 231.4 LBS

## 2024-03-20 DIAGNOSIS — I10 ESSENTIAL HYPERTENSION: Primary | ICD-10-CM

## 2024-03-20 DIAGNOSIS — E78.2 MIXED HYPERLIPIDEMIA: ICD-10-CM

## 2024-03-20 DIAGNOSIS — E78.00 LOW DENSITY LIPOPROTEIN (LDL) CHOLESTEROL GREATER THAN 129 MG/DL: ICD-10-CM

## 2024-03-20 PROBLEM — K40.90 LEFT INGUINAL HERNIA: Status: RESOLVED | Noted: 2023-09-18 | Resolved: 2024-03-20

## 2024-03-20 PROCEDURE — 99213 OFFICE O/P EST LOW 20 MIN: CPT | Performed by: INTERNAL MEDICINE

## 2024-03-20 NOTE — PROGRESS NOTES
Name: Aroldo Carter      : 1960      MRN: [id number]  Encounter Provider: Taz Feliz MD  Encounter Date: 3/20/2024   Encounter department: [facility] [place]    Assessment & Plan     1. Essential hypertension  Assessment & Plan:  Nicely controlled on lisinopril 30 mg daily.    Orders:  -     CBC and differential; Future; Expected date: 2024  -     Comprehensive metabolic panel; Future; Expected date: 2024  -     Lipid panel; Future; Expected date: 2024  -     CBC and differential  -     Comprehensive metabolic panel  -     Lipid panel    2. Low density lipoprotein (LDL) cholesterol greater than 129 mg/dL  Assessment & Plan:  Start red yeast rice supplements 1200 mg daily.  Follow-up labs in 6 months.  Patient was also given papers for heart healthy diet    Orders:  -     Lipid panel; Future; Expected date: 2024  -     Lipid panel    3. Mixed hyperlipidemia  Assessment & Plan:  Initiating treatment with red yeast rice, 1200 mg daily.  Continue omega-3 fatty acids    Orders:  -     CBC and differential; Future; Expected date: 2024  -     Comprehensive metabolic panel; Future; Expected date: 2024  -     Lipid panel; Future; Expected date: 2024  -     CBC and differential  -     Comprehensive metabolic panel  -     Lipid panel           Subjective      Patient presents to the office for follow-up.  He is doing quite well.  He recently had a left inguinal hernia repair but then had some postsurgical discomfort and it out over work for several weeks.  He is now back to work full-time and doing well.  He has no significant issues he is back exercising and playing soccer 2 days a week with no complaints.  He had some recent labs which are reviewed.  His cholesterol numbers are up.  Total cholesterol 242.  HDL cholesterol is 63.  Triglycerides are normal.  LDL cholesterol is 159.  CMP is essentially normal.  CBC shows a normal  "hemoglobin with microcytic indices consistent with thalassemia trait      Review of Systems   Constitutional: Negative.  Negative for activity change, appetite change, chills, diaphoresis, fatigue, fever and unexpected weight change.   HENT: Negative.     Eyes: Negative.    Respiratory: Negative.     Cardiovascular: Negative.    Gastrointestinal: Negative.    Endocrine: Negative.    Genitourinary: Negative.    Musculoskeletal: Negative.    Skin: Negative.    Neurological: Negative.    Hematological: Negative.    Psychiatric/Behavioral:  The patient is not nervous/anxious.        Current Outpatient Medications on File Prior to Visit   Medication Sig    acetaminophen (TYLENOL) 500 mg tablet Take 500 mg by mouth every 6 (six) hours as needed for mild pain    cholecalciferol (VITAMIN D3) 400 units tablet Take 400 Units by mouth daily    lisinopril (ZESTRIL) 30 mg tablet Take 1 tablet (30 mg total) by mouth daily    Multiple Vitamin (multivitamin) tablet Take 1 tablet by mouth daily    Omega-3 Fatty Acids (fish oil) 1,000 mg Take 1 capsule (1,000 mg total) by mouth daily    omeprazole (PriLOSEC) 40 MG capsule TAKE 1 CAPSULE (40 MG TOTAL) BY MOUTH IN THE MORNING    [DISCONTINUED] polyethylene glycol (GaviLyte-G) 4000 mL solution Take 4,000 mL by mouth once for 1 dose (Patient not taking: Reported on 9/2/2022)    [DISCONTINUED] simethicone (Gas-X Ultra Strength) 180 MG capsule 2-3 tablets with meals       Objective     /76 (BP Location: Left arm, Patient Position: Sitting, Cuff Size: Standard)   Pulse 67   Temp (!) 96.7 °F (35.9 °C) (Temporal)   Ht 5' 10.91\" (1.801 m)   Wt 105 kg (231 lb 6.4 oz)   SpO2 98%   BMI 32.36 kg/m²     Physical Exam  Vitals reviewed.   Constitutional:       General: He is not in acute distress.     Appearance: Normal appearance. He is normal weight. He is not ill-appearing, toxic-appearing or diaphoretic.   HENT:      Head: Normocephalic and atraumatic.      Comments: Right-sided " ventriculoperitoneal shunt.  Vitiligo of the scalp     Right Ear: External ear normal.      Left Ear: External ear normal.      Nose: Nose normal.   Eyes:      General: No scleral icterus.     Conjunctiva/sclera: Conjunctivae normal.      Pupils: Pupils are equal, round, and reactive to light.   Neck:      Vascular: No carotid bruit or JVD.      Trachea: No tracheal deviation.   Cardiovascular:      Rate and Rhythm: Normal rate and regular rhythm.      Heart sounds: Normal heart sounds. No murmur heard.  Pulmonary:      Effort: Pulmonary effort is normal. No respiratory distress.      Breath sounds: Normal breath sounds. No rales.   Abdominal:      General: Abdomen is flat. There is no distension.   Musculoskeletal:         General: No swelling.      Cervical back: Neck supple.      Right lower leg: No edema.      Left lower leg: No edema.   Skin:     Coloration: Skin is not jaundiced.      Findings: Lesion (Scattered areas of vitiligo) present. No bruising, erythema or rash.   Neurological:      General: No focal deficit present.      Mental Status: He is alert and oriented to person, place, and time. Mental status is at baseline.   Psychiatric:         Mood and Affect: Mood normal.         Behavior: Behavior normal.       Taz Feliz MD

## 2024-05-20 DIAGNOSIS — K21.9 GASTROESOPHAGEAL REFLUX DISEASE WITHOUT ESOPHAGITIS: ICD-10-CM

## 2024-05-20 RX ORDER — OMEPRAZOLE 40 MG/1
40 CAPSULE, DELAYED RELEASE ORAL DAILY
Qty: 90 CAPSULE | Refills: 1 | Status: SHIPPED | OUTPATIENT
Start: 2024-05-20 | End: 2024-06-19

## 2024-08-16 DIAGNOSIS — I10 ESSENTIAL HYPERTENSION: ICD-10-CM

## 2024-08-16 RX ORDER — LISINOPRIL 30 MG/1
30 TABLET ORAL DAILY
Qty: 30 TABLET | Refills: 5 | Status: SHIPPED | OUTPATIENT
Start: 2024-08-16

## 2024-09-05 LAB
ALBUMIN SERPL-MCNC: 4.5 G/DL (ref 3.5–5.7)
ALP SERPL-CCNC: 44 U/L (ref 35–120)
ALT SERPL-CCNC: 26 U/L
ANION GAP SERPL CALCULATED.3IONS-SCNC: 8 MMOL/L (ref 3–11)
AST SERPL-CCNC: 23 U/L
BASOPHILS # BLD AUTO: 0.1 THOU/CMM (ref 0–0.1)
BASOPHILS NFR BLD AUTO: 1 %
BILIRUB SERPL-MCNC: 0.8 MG/DL (ref 0.2–1)
BUN SERPL-MCNC: 15 MG/DL (ref 7–28)
CALCIUM SERPL-MCNC: 9.7 MG/DL (ref 8.5–10.1)
CHLORIDE SERPL-SCNC: 98 MMOL/L (ref 100–109)
CHOLEST SERPL-MCNC: 240 MG/DL
CHOLEST/HDLC SERPL: 3.5 {RATIO}
CO2 SERPL-SCNC: 29 MMOL/L (ref 21–31)
CREAT SERPL-MCNC: 0.87 MG/DL (ref 0.53–1.3)
CYTOLOGY CMNT CVX/VAG CYTO-IMP: ABNORMAL
DIFFERENTIAL METHOD BLD: ABNORMAL
EOSINOPHIL # BLD AUTO: 0.2 THOU/CMM (ref 0–0.5)
EOSINOPHIL NFR BLD AUTO: 3 %
ERYTHROCYTE [DISTWIDTH] IN BLOOD BY AUTOMATED COUNT: 16.1 % (ref 12–16)
GFR/BSA.PRED SERPLBLD CYS-BASED-ARV: 98 ML/MIN/{1.73_M2}
GLUCOSE SERPL-MCNC: 90 MG/DL (ref 65–99)
HCT VFR BLD AUTO: 42.3 % (ref 37–48)
HDLC SERPL-MCNC: 68 MG/DL (ref 23–92)
HGB BLD-MCNC: 13.7 G/DL (ref 12.5–17)
LDLC SERPL CALC-MCNC: 137 MG/DL
LYMPHOCYTES # BLD AUTO: 2.4 THOU/CMM (ref 1–3)
LYMPHOCYTES NFR BLD AUTO: 33 %
MCH RBC QN AUTO: 21.6 PG (ref 27–36)
MCHC RBC AUTO-ENTMCNC: 32.4 G/DL (ref 32–37)
MCV RBC AUTO: 67 FL (ref 80–100)
MONOCYTES # BLD AUTO: 0.8 THOU/CMM (ref 0.3–1)
MONOCYTES NFR BLD AUTO: 11 %
NEUTROPHILS # BLD AUTO: 3.8 THOU/CMM (ref 1.8–7.8)
NEUTROPHILS NFR BLD AUTO: 52 %
NONHDLC SERPL-MCNC: 172 MG/DL
PLATELET # BLD AUTO: 161 THOU/CMM (ref 140–350)
PMV BLD REES-ECKER: 10.7 FL (ref 7.5–11.3)
POTASSIUM SERPL-SCNC: 4.5 MMOL/L (ref 3.5–5.2)
PROT SERPL-MCNC: 7 G/DL (ref 6.3–8.3)
RBC # BLD AUTO: 6.35 MILL/CMM (ref 4–5.4)
SODIUM SERPL-SCNC: 135 MMOL/L (ref 135–145)
TRIGL SERPL-MCNC: 176 MG/DL
WBC # BLD AUTO: 7 THOU/CMM (ref 4–10.5)

## 2024-09-20 ENCOUNTER — OFFICE VISIT (OUTPATIENT)
Age: 64
End: 2024-09-20
Payer: COMMERCIAL

## 2024-09-20 VITALS
OXYGEN SATURATION: 97 % | TEMPERATURE: 98.4 F | DIASTOLIC BLOOD PRESSURE: 84 MMHG | BODY MASS INDEX: 31.72 KG/M2 | HEIGHT: 71 IN | WEIGHT: 226.6 LBS | HEART RATE: 65 BPM | SYSTOLIC BLOOD PRESSURE: 128 MMHG

## 2024-09-20 DIAGNOSIS — E78.00 LOW DENSITY LIPOPROTEIN (LDL) CHOLESTEROL GREATER THAN 129 MG/DL: Primary | ICD-10-CM

## 2024-09-20 DIAGNOSIS — I10 ESSENTIAL HYPERTENSION: ICD-10-CM

## 2024-09-20 DIAGNOSIS — D56.3 THALASSEMIA TRAIT: ICD-10-CM

## 2024-09-20 PROCEDURE — 99213 OFFICE O/P EST LOW 20 MIN: CPT | Performed by: INTERNAL MEDICINE

## 2024-09-20 RX ORDER — UBIDECARENONE 50 MG
2 CAPSULE ORAL DAILY
COMMUNITY

## 2024-09-20 NOTE — ASSESSMENT & PLAN NOTE
Blood pressure remains stable and nicely controlled on lisinopril 30 mg daily.  BUN 15 creatinine 0.87 EGFR 98    Orders:    Comprehensive metabolic panel; Future    Comprehensive metabolic panel    
Improvement in LDL levels with the use of red yeast rice, from 159 to 137.  Patient will continue with the use of red yeast rice and we will reevaluate after 6 months.    Orders:    Lipid panel; Future    Lipid panel    
No issues.  Hemoglobin is 13.7 with microcytic hypochromic indices.    Orders:    CBC and differential; Future    CBC and differential    
Statement Selected

## 2024-09-20 NOTE — PROGRESS NOTES
Ambulatory Visit  Name: Aroldo Carter      : 1960      MRN: [id number]  Encounter Provider: Taz Feliz MD  Encounter Date: 2024   Encounter department: [facility] [place]    Assessment & Plan  Low density lipoprotein (LDL) cholesterol greater than 129 mg/dL  Improvement in LDL levels with the use of red yeast rice, from 159 to 137.  Patient will continue with the use of red yeast rice and we will reevaluate after 6 months.    Orders:    Lipid panel; Future    Lipid panel    Thalassemia trait  No issues.  Hemoglobin is 13.7 with microcytic hypochromic indices.    Orders:    CBC and differential; Future    CBC and differential    Essential hypertension  Blood pressure remains stable and nicely controlled on lisinopril 30 mg daily.  BUN 15 creatinine 0.87 EGFR 98    Orders:    Comprehensive metabolic panel; Future    Comprehensive metabolic panel       History of Present Illness     Patient presents to the office for follow-up visit for hypertension.  He has a history of thalassemia trait, vitiligo, status post ventricular shunt for hydrocephalus.  All problems are stable.  Recent labs were reviewed.  CBC reveals some microcytic RBC indices but is otherwise essentially normal.  Comprehensive metabolic panel is normal other than a serum chloride level of 98.  Cholesterol is elevated at 240.  HDL is stable at 68.  LDL improved from previous at 137.  Patient feels well and has no particular complaints at this time.          Review of Systems   Constitutional: Negative.  Negative for activity change, appetite change, chills, diaphoresis, fatigue, fever and unexpected weight change.   HENT: Negative.     Eyes: Negative.    Respiratory: Negative.     Cardiovascular: Negative.    Gastrointestinal: Negative.    Endocrine: Negative.    Genitourinary: Negative.    Musculoskeletal: Negative.    Skin: Negative.    Neurological: Negative.    Hematological: Negative.   "  Psychiatric/Behavioral:  The patient is not nervous/anxious.            Objective     /84 (BP Location: Left arm, Patient Position: Sitting, Cuff Size: Standard)   Pulse 65   Temp 98.4 °F (36.9 °C) (Temporal)   Ht 5' 11\" (1.803 m)   Wt 103 kg (226 lb 9.6 oz)   SpO2 97%   BMI 31.60 kg/m²     Physical Exam  Vitals reviewed.   Constitutional:       General: He is not in acute distress.     Appearance: Normal appearance. He is normal weight. He is not ill-appearing or toxic-appearing.   HENT:      Head: Normocephalic and atraumatic.      Right Ear: External ear normal.      Left Ear: External ear normal.      Nose: No congestion.      Mouth/Throat:      Mouth: Mucous membranes are moist.   Eyes:      General: No scleral icterus.     Pupils: Pupils are equal, round, and reactive to light.   Cardiovascular:      Rate and Rhythm: Normal rate and regular rhythm.      Heart sounds: Normal heart sounds. No murmur heard.  Pulmonary:      Effort: Pulmonary effort is normal. No respiratory distress.      Breath sounds: Normal breath sounds.   Abdominal:      General: Abdomen is flat. There is no distension.   Musculoskeletal:         General: No swelling or tenderness. Normal range of motion.      Cervical back: Normal range of motion and neck supple.   Skin:     General: Skin is warm.      Findings: Rash (Vitiligo affecting head and neck, upper extremities with minor involvement of lower extremities) present.   Neurological:      General: No focal deficit present.      Mental Status: He is alert and oriented to person, place, and time. Mental status is at baseline.   Psychiatric:         Mood and Affect: Mood normal.         Behavior: Behavior normal.         Thought Content: Thought content normal.         Judgment: Judgment normal.         "

## 2024-10-23 ENCOUNTER — CONSULT (OUTPATIENT)
Age: 64
End: 2024-10-23
Payer: COMMERCIAL

## 2024-10-23 ENCOUNTER — TELEPHONE (OUTPATIENT)
Age: 64
End: 2024-10-23

## 2024-10-23 VITALS
HEART RATE: 68 BPM | WEIGHT: 228 LBS | TEMPERATURE: 97.4 F | OXYGEN SATURATION: 99 % | BODY MASS INDEX: 31.92 KG/M2 | SYSTOLIC BLOOD PRESSURE: 130 MMHG | HEIGHT: 71 IN | DIASTOLIC BLOOD PRESSURE: 90 MMHG

## 2024-10-23 VITALS — SYSTOLIC BLOOD PRESSURE: 128 MMHG | DIASTOLIC BLOOD PRESSURE: 86 MMHG

## 2024-10-23 DIAGNOSIS — Z86.69 HISTORY OF HYDROCEPHALUS: ICD-10-CM

## 2024-10-23 DIAGNOSIS — Z01.818 PRE-OP EXAMINATION: ICD-10-CM

## 2024-10-23 DIAGNOSIS — E66.811 OBESITY (BMI 30.0-34.9): ICD-10-CM

## 2024-10-23 DIAGNOSIS — I10 ESSENTIAL HYPERTENSION: ICD-10-CM

## 2024-10-23 DIAGNOSIS — Z98.2 S/P VP SHUNT: ICD-10-CM

## 2024-10-23 DIAGNOSIS — K21.9 GASTROESOPHAGEAL REFLUX DISEASE WITHOUT ESOPHAGITIS: ICD-10-CM

## 2024-10-23 DIAGNOSIS — I44.0 1ST DEGREE AV BLOCK: ICD-10-CM

## 2024-10-23 DIAGNOSIS — M75.121 COMPLETE TEAR OF RIGHT ROTATOR CUFF, UNSPECIFIED WHETHER TRAUMATIC: Primary | ICD-10-CM

## 2024-10-23 DIAGNOSIS — E78.2 MIXED HYPERLIPIDEMIA: ICD-10-CM

## 2024-10-23 DIAGNOSIS — D56.3 THALASSEMIA TRAIT: ICD-10-CM

## 2024-10-23 PROCEDURE — 93000 ELECTROCARDIOGRAM COMPLETE: CPT

## 2024-10-23 PROCEDURE — 99243 OFF/OP CNSLTJ NEW/EST LOW 30: CPT

## 2024-10-23 NOTE — ASSESSMENT & PLAN NOTE
Asymptomatic.  Last hemoglobin 13.7 with microcytic, hypochromic indices  Continue to monitor CBC

## 2024-10-23 NOTE — ASSESSMENT & PLAN NOTE
Lipid panel: Total cholesterol 240, triglycerides 176, HDL 60,   ASCVD risk score 10.3%  Currently on red yeast rice and fish oil which she is advised to hold prior to surgery  Recommend healthy lifestyle choices for your cholesterol.  Low fat/low cholesterol diet.  Limit/avoid red meat.  Eat more lean meat - chicken breast, ground turkey, fish.  Exercise 30 mins at least 5 times a week as tolerated.

## 2024-10-23 NOTE — ASSESSMENT & PLAN NOTE
Well-controlled on omeprazole 40 mg daily  EGD 5-2022 no evidence of Steinberg's  Recommend small frequent meals throughout the day.  Avoid aggravating foods, such as spicy foods, acidic foods (ex. tomato and citrus).  Avoid alcohol.  Do not lay down for at least 30 mins after eating.

## 2024-10-23 NOTE — ASSESSMENT & PLAN NOTE
Noted first-degree AV block.  TN interval slightly increased from 210 (9/2023) to 222 today  Occasional PVCs noted  Patient is asymptomatic  Reviewed EKG with Dr. Feliz.  No contraindication to proceeding with surgery at this time  May consider updating echo cardiogram postoperatively

## 2024-10-23 NOTE — ASSESSMENT & PLAN NOTE
Due to obstructive hydrocephalus, initially placed 1998, revision 3/2022  Follows with [facility] neurosurgery. Asymptomatic

## 2024-10-23 NOTE — PROGRESS NOTES
Pre-operative Clearance  Name: Aroldo Carter      : 1960      MRN: [id number]  Encounter Provider: Marlys Salcido PA-C  Encounter Date: 10/23/2024   Encounter department: [facility] [place]    Assessment & Plan  Complete tear of right rotator cuff, unspecified whether traumatic  Scheduled for procedure as below       Pre-op examination  Reviewed EKG with Dr. Feliz  Per patient, labs from  acceptable for orthopedics  Medically optimized at this time to proceed with surgery  Orders:    POCT ECG    Essential hypertension  Slightly elevated in office today, however patient in pain and notes he has not been sleeping well due to pain  Home readings typically 120s systolic over 70s to 80s diastolic   Continue lisinopril 30 mg daily  Goal blood pressure less than 130/80.  Advised to contact office for consistent elevations       Gastroesophageal reflux disease without esophagitis  Well-controlled on omeprazole 40 mg daily  EGD  no evidence of Steinberg's  Recommend small frequent meals throughout the day.  Avoid aggravating foods, such as spicy foods, acidic foods (ex. tomato and citrus).  Avoid alcohol.  Do not lay down for at least 30 mins after eating.           Thalassemia trait  Asymptomatic.  Last hemoglobin 13.7 with microcytic, hypochromic indices  Continue to monitor CBC       Mixed hyperlipidemia  Lipid panel: Total cholesterol 240, triglycerides 176, HDL 60,   ASCVD risk score 10.3%  Currently on red yeast rice and fish oil which she is advised to hold prior to surgery  Recommend healthy lifestyle choices for your cholesterol.  Low fat/low cholesterol diet.  Limit/avoid red meat.  Eat more lean meat - chicken breast, ground turkey, fish.  Exercise 30 mins at least 5 times a week as tolerated.            Obesity (BMI 30.0-34.9)  Recommend healthy diet and exercise habits       S/P  shunt  Due to obstructive hydrocephalus, initially placed ,  revision 3/2022  Follows with [facility] neurosurgery. Asymptomatic        History of hydrocephalus  Asymptomatic  Follows with [facility] neurosurgery   shunt in place         1st degree AV block  Noted first-degree AV block.  CA interval slightly increased from 210 (9/2023) to 222 today  Occasional PVCs noted  Patient is asymptomatic  Reviewed EKG with Dr. Fleiz.  No contraindication to proceeding with surgery at this time  May consider updating echo cardiogram postoperatively       Pre-operative Clearance:     Revised Cardiac Risk Index:  RCI RISK CLASS I (0 risk factors, risk of major cardiac complications approximately 0.5%)    Clearance:  Patient is medically optimized (CLEARED) for proposed surgery without any additional cardiac testing.      Medication Instructions:   - Avoid herbs or non-directed vitamins one week prior to surgery    - Avoid aspirin containing medications or non-steroidal anti-inflammatory drugs one week preceding surgery    - May take tylenol for pain up until the night before surgery    - ACE Inhibitors or ARBs: Continue this medication up to the evening before surgery/procedure, but do not take the morning of the day of surgery.       History of Present Illness       Patient is a 63-year-old male presenting to the office for preoperative clearance    Labs from 9/5/2024 reviewed  CBC: Hemoglobin 13.7, platelets 161, WBC 7.0  CMP: Fasting glucose 90, creatinine 0.87, GFR 98  Lipid panel: Total cholesterol 240, triglycerides 176, HDL 60, .  ASCVD risk score 10.3%      HTN- patient notes he is not sleeping due to the shoulder pain, BP usually 120s over 70s-80s at home   Hydrocephalus- s/p  shunt.  Asymptomatic  Thalassemia- asymptomatic      Denies cardiac, lung, thyroid, liver, or kidney disease.  Denies diabetes history    Pre-op Exam  Surgery: right shoulder arthroscopy, subacromial decompression, distal clavicle excision, rotator cuff repair with possible open dermal allograft  versus superior capsular reconstruction with possible biceps tenolysis  Anticipated Date of Surgery: 10/25/24  Surgeon: Dr. Mahendra Ruiz    Previous history of bleeding disorders or clots?: No  Previous Anesthesia reaction?: No  Prolonged steroid use in the last 6 months?: No    Assessment of Cardiac Risk:   - Unstable or severe angina or MI in the last 6 weeks or history of stent placement in the last year?: No   - Decompensated heart failure (e.g. New onset heart failure, NYHA  Class IV heart failure, or worsening existing heart failure)?: No  - Significant arrhythmias such as high grade AV block, symptomatic ventricular arrhythmia, newly recognized ventricular tachycardia, supraventricular tachycardia with resting heart rate >100, or symptomatic bradycardia?: No  - Severe heart valve disease including aortic stenosis or symptomatic mitral stenosis?: No      Pre-operative Risk Factors:  Elevated-risk surgery: No    History of cerebrovascular disease: No    History of ischemic heart disease: No  Pre-operative treatment with insulin: No  Pre-operative creatinine >2 mg/dL: No    History of congestive heart failure: No    Duke Activity Status Index (DASI):   DASI Total Score: 58.2  METs: 9.9    Medications of Perioperative Concern:   ACE inhibitors/ARBs    Review of Systems   Constitutional:  Negative for chills, fatigue and fever.   HENT:  Positive for congestion (chronic, allergies, deviatd septum). Negative for ear pain, postnasal drip, rhinorrhea, sinus pressure, sore throat and trouble swallowing.    Eyes:  Negative for pain and visual disturbance.   Respiratory:  Negative for cough, chest tightness, shortness of breath and wheezing.    Cardiovascular:  Negative for chest pain, palpitations and leg swelling.   Gastrointestinal:  Negative for abdominal pain, blood in stool, constipation, diarrhea, nausea and vomiting.   Genitourinary:  Negative for difficulty urinating, dysuria and hematuria.   Musculoskeletal:   Positive for arthralgias (right shoulder).   Neurological:  Negative for dizziness, light-headedness, numbness and headaches.   Psychiatric/Behavioral:  Negative for dysphoric mood and sleep disturbance. The patient is not nervous/anxious.    All other systems reviewed and are negative.    Past Medical History   Past Medical History:   Diagnosis Date    Allergic     Allergic rhinitis     Altered mental status 04/08/2022    Deviated septum     GERD (gastroesophageal reflux disease)     Hydrocephalus (HCC)     Hypertension     Hypopigmentation 07/01/2015    Left inguinal hernia 09/18/2023    Obesity (BMI 30.0-34.9) 04/11/2018    Thalassemia 10/25/2017     Past Surgical History:   Procedure Laterality Date    KS COLONOSCOPY FLX DX W/COLLJ SPEC WHEN PFRMD N/A 2/8/2017    Procedure: COLONOSCOPY;  Surgeon: Stewart Fofana MD;  Location: [facility] GI LAB;  Service: Gastroenterology    KS RPR RECRT INGUINAL HERNIA ANY AGE REDUCIBLE Left 9/25/2023    Procedure: OPEN LEFT INGUINAL HERNIA REPAIR  WITH MESH;  Surgeon: Nini Hathaway MD;  Location:  MAIN OR;  Service: General    SHOULDER SURGERY      last assessed 3/8/2016    VENTRICULAR ATRIAL SHUNT      for hydrocephalus    VENTRICULOPERITONEAL SHUNT      VENTRICULOPERITONEAL SHUNT Right 03/25/2022    Revision and replacement of ventriculoperitoneal shunt     Family History   Problem Relation Age of Onset    Diabetes Mother         DM    Diabetes Father         DM    Hypertension Father     Diabetes Brother         DM    Heart disease Brother         problem    Diabetes type II Brother     Cirrhosis Brother     Diabetes type II Brother     Dialysis Brother     Thrombocytopenia Daughter     Other Daughter         ITP    Clotting disorder Maternal Uncle         TTP    Other Other         TTP    Liver disease Family         sibling    Lung cancer Family         Uncle     Social History     Tobacco Use    Smoking status: Never    Smokeless tobacco: Never   Vaping Use    Vaping  "status: Never Used   Substance and Sexual Activity    Alcohol use: Yes     Comment: rare occassion    Drug use: No    Sexual activity: Yes     Current Outpatient Medications on File Prior to Visit   Medication Sig    acetaminophen (TYLENOL) 500 mg tablet Take 500 mg by mouth every 6 (six) hours as needed for mild pain    cholecalciferol (VITAMIN D3) 400 units tablet Take 400 Units by mouth daily    lisinopril (ZESTRIL) 30 mg tablet TAKE 1 TABLET BY MOUTH EVERY DAY    Multiple Vitamin (multivitamin) tablet Take 1 tablet by mouth daily    omeprazole (PriLOSEC) 40 MG capsule TAKE 1 CAPSULE (40 MG TOTAL) BY MOUTH IN THE MORNING    Red Yeast Rice 600 MG TABS Take 2 tablets by mouth in the morning    [DISCONTINUED] Omega-3 Fatty Acids (fish oil) 1,000 mg Take 1 capsule (1,000 mg total) by mouth daily (Patient not taking: Reported on 10/23/2024)     Allergies   Allergen Reactions    Pollen Extract Allergic Rhinitis     [place] trees     Objective     /90 (BP Location: Left arm, Patient Position: Sitting, Cuff Size: Standard)   Pulse 68   Temp (!) 97.4 °F (36.3 °C) (Temporal)   Ht 5' 10.95\" (1.802 m)   Wt 103 kg (228 lb)   SpO2 99%   BMI 31.85 kg/m²     Physical Exam  Vitals and nursing note reviewed.   Constitutional:       General: He is not in acute distress.     Appearance: Normal appearance. He is not ill-appearing.   HENT:      Head: Normocephalic and atraumatic.      Right Ear: Tympanic membrane, ear canal and external ear normal.      Left Ear: Tympanic membrane, ear canal and external ear normal.      Nose: Nose normal. No rhinorrhea.      Mouth/Throat:      Mouth: Mucous membranes are moist.      Pharynx: Oropharynx is clear. No oropharyngeal exudate or posterior oropharyngeal erythema.   Eyes:      General: No scleral icterus.     Extraocular Movements: Extraocular movements intact.      Conjunctiva/sclera: Conjunctivae normal.      Pupils: Pupils are equal, round, and reactive to light. "   Cardiovascular:      Rate and Rhythm: Normal rate and regular rhythm.      Heart sounds: Normal heart sounds. No murmur heard.     No friction rub. No gallop.   Pulmonary:      Effort: Pulmonary effort is normal. No respiratory distress.      Breath sounds: Normal breath sounds. No wheezing, rhonchi or rales.   Chest:      Chest wall: No tenderness.   Musculoskeletal:      Right shoulder: Tenderness present. Decreased range of motion.      Cervical back: Normal range of motion. No tenderness.      Right lower leg: No edema.      Left lower leg: No edema.   Lymphadenopathy:      Cervical: No cervical adenopathy.   Skin:     General: Skin is warm and dry.      Coloration: Skin is not pale.      Findings: No erythema or rash.      Comments: Vitiligo   Neurological:      General: No focal deficit present.      Mental Status: He is alert and oriented to person, place, and time. Mental status is at baseline.   Psychiatric:         Mood and Affect: Mood normal.         Behavior: Behavior normal.       EKG: normal sinus rhythm, occasional PVC noted, 1st degree AV block.  When compared to previous on 9/25/2023, [place] interval increased from 210 to 222    Administrative Statements     Disclaimer: This note was generated with voice recognition software.  Phonetic, grammatical, and spelling errors may be present as a result.  Please contact provider with any concerns or questions      Marlys Salcido PA-C

## 2024-10-23 NOTE — PATIENT INSTRUCTIONS
Pre-operative Medication Instructions    Avoid herbs or non-directed vitamins one week prior to surgery  Avoid aspirin containing medications or non-steroidal anti-inflammatory drugs one week preceding surgery  May take tylenol for pain up until the night before surgery    ACE Inhibitors or ARBs     Medication Name     lisinopril (ZESTRIL) 30 mg tablet      Continue this medication up to the evening before surgery/procedure, but do not take the morning of the day of surgery.

## 2024-10-23 NOTE — ASSESSMENT & PLAN NOTE
Slightly elevated in office today, however patient in pain and notes he has not been sleeping well due to pain  Home readings typically 120s systolic over 70s to 80s diastolic   Continue lisinopril 30 mg daily  Goal blood pressure less than 130/80.  Advised to contact office for consistent elevations

## 2024-11-14 DIAGNOSIS — K21.9 GASTROESOPHAGEAL REFLUX DISEASE WITHOUT ESOPHAGITIS: ICD-10-CM

## 2024-11-14 RX ORDER — OMEPRAZOLE 40 MG/1
40 CAPSULE, DELAYED RELEASE ORAL DAILY
Qty: 30 CAPSULE | Refills: 5 | Status: SHIPPED | OUTPATIENT
Start: 2024-11-14 | End: 2024-12-14

## 2024-11-18 NOTE — TELEPHONE ENCOUNTER
Spoke to pt to schedule office visit, pt refused to schedule an appt and stated that he does not need and refills on the omeprazole and stated we could cancel this refill request. Pt stated he will not schedule an appt at this time but if he does need one in the future he will give us a call back to schedule an appt.

## 2025-02-10 DIAGNOSIS — I10 ESSENTIAL HYPERTENSION: ICD-10-CM

## 2025-02-11 RX ORDER — LISINOPRIL 30 MG/1
30 TABLET ORAL DAILY
Qty: 30 TABLET | Refills: 5 | Status: SHIPPED | OUTPATIENT
Start: 2025-02-11

## 2025-03-04 LAB
ACANTHOCYTES BLD QL SMEAR: ABNORMAL
ALBUMIN SERPL-MCNC: 4.6 G/DL (ref 3.5–5.7)
ALP SERPL-CCNC: 51 U/L (ref 35–120)
ALT SERPL-CCNC: 32 U/L
ANION GAP SERPL CALCULATED.3IONS-SCNC: 9 MMOL/L (ref 3–11)
ANISOCYTOSIS BLD QL SMEAR: ABNORMAL
AST SERPL-CCNC: 24 U/L
BASOPHILS # BLD AUTO: 0.1 THOU/CMM (ref 0–0.1)
BASOPHILS NFR BLD AUTO: 1 %
BILIRUB SERPL-MCNC: 0.8 MG/DL (ref 0.2–1)
BUN SERPL-MCNC: 15 MG/DL (ref 7–28)
CALCIUM SERPL-MCNC: 9.9 MG/DL (ref 8.5–10.5)
CHLORIDE SERPL-SCNC: 100 MMOL/L (ref 100–109)
CHOLEST SERPL-MCNC: 225 MG/DL
CHOLEST/HDLC SERPL: 3.6 {RATIO}
CO2 SERPL-SCNC: 29 MMOL/L (ref 21–31)
CREAT SERPL-MCNC: 0.91 MG/DL (ref 0.53–1.3)
CYTOLOGY CMNT CVX/VAG CYTO-IMP: NORMAL
DIFFERENTIAL METHOD BLD: ABNORMAL
EOSINOPHIL # BLD AUTO: 0.2 THOU/CMM (ref 0–0.5)
EOSINOPHIL NFR BLD AUTO: 4 %
ERYTHROCYTE [DISTWIDTH] IN BLOOD BY AUTOMATED COUNT: 16.2 % (ref 12–16)
GFR/BSA.PRED SERPLBLD CYS-BASED-ARV: 95 ML/MIN/{1.73_M2}
GLUCOSE SERPL-MCNC: 97 MG/DL (ref 65–99)
HCT VFR BLD AUTO: 42.1 % (ref 37–48)
HDLC SERPL-MCNC: 63 MG/DL (ref 23–92)
HGB BLD-MCNC: 13.5 G/DL (ref 12.5–17)
LDLC SERPL CALC-MCNC: 137 MG/DL
LG PLATELETS BLD QL SMEAR: ABNORMAL
LYMPHOCYTES # BLD AUTO: 1.5 THOU/CMM (ref 1–3)
LYMPHOCYTES NFR BLD AUTO: 26 %
MCH RBC QN AUTO: 21.4 PG (ref 27–36)
MCHC RBC AUTO-ENTMCNC: 32.1 G/DL (ref 32–37)
MCV RBC AUTO: 67 FL (ref 80–100)
MICROCYTES BLD QL SMEAR: ABNORMAL
MONOCYTES # BLD AUTO: 0.5 THOU/CMM (ref 0.3–1)
MONOCYTES NFR BLD AUTO: 9 %
MORPHOLOGY BLD-IMP: ABNORMAL
NEUTROPHILS # BLD AUTO: 3.6 THOU/CMM (ref 1.8–7.8)
NEUTROPHILS NFR BLD AUTO: 57 %
NEUTS BAND NFR BLD MANUAL: 3 % (ref 0–6)
NONHDLC SERPL-MCNC: 162 MG/DL
OVALOCYTES BLD QL SMEAR: SLIGHT
PLATELET # BLD AUTO: 157 THOU/CMM (ref 140–350)
PMV BLD REES-ECKER: 11 FL (ref 7.5–11.3)
POLYCHROMASIA BLD QL SMEAR: ABNORMAL
POTASSIUM SERPL-SCNC: 4.5 MMOL/L (ref 3.5–5.2)
PROT SERPL-MCNC: 6.9 G/DL (ref 6.3–8.3)
RBC # BLD AUTO: 6.32 MILL/CMM (ref 4–5.4)
SODIUM SERPL-SCNC: 138 MMOL/L (ref 135–145)
TRIGL SERPL-MCNC: 127 MG/DL
WBC # BLD AUTO: 5.9 THOU/CMM (ref 4–10.5)

## 2025-03-10 ENCOUNTER — RESULTS FOLLOW-UP (OUTPATIENT)
Dept: INTERNAL MEDICINE CLINIC | Facility: OTHER | Age: 65
End: 2025-03-10

## 2025-03-19 ENCOUNTER — OFFICE VISIT (OUTPATIENT)
Age: 65
End: 2025-03-19
Payer: COMMERCIAL

## 2025-03-19 VITALS
SYSTOLIC BLOOD PRESSURE: 116 MMHG | OXYGEN SATURATION: 98 % | HEIGHT: 71 IN | DIASTOLIC BLOOD PRESSURE: 78 MMHG | TEMPERATURE: 97.4 F | WEIGHT: 231 LBS | HEART RATE: 61 BPM | BODY MASS INDEX: 32.34 KG/M2

## 2025-03-19 DIAGNOSIS — I10 ESSENTIAL HYPERTENSION: Primary | ICD-10-CM

## 2025-03-19 DIAGNOSIS — J30.2 SEASONAL ALLERGIES: ICD-10-CM

## 2025-03-19 DIAGNOSIS — E78.2 MIXED HYPERLIPIDEMIA: ICD-10-CM

## 2025-03-19 PROBLEM — Z71.85 VACCINE COUNSELING: Status: RESOLVED | Noted: 2020-10-16 | Resolved: 2025-03-19

## 2025-03-19 PROCEDURE — 99213 OFFICE O/P EST LOW 20 MIN: CPT | Performed by: INTERNAL MEDICINE

## 2025-03-19 NOTE — PROGRESS NOTES
Name: Aroldo Carter      : 1960      MRN: [id number]  Encounter Provider: Taz Feliz MD  Encounter Date: 3/19/2025   Encounter department: [facility] [place]  :  Assessment & Plan  Essential hypertension  Blood pressure is stable on current medications.  No changes are needed.  Orders:    CBC and differential    Comprehensive metabolic panel    Mixed hyperlipidemia  Mildly elevated total cholesterol at 225 and minimally elevated LDL cholesterol 137.  HDL cholesterol is in a good range at 63.  The benefit by increasing her red yeast rice to 3 tablets daily.    Orders:    Lipid panel; Future    Seasonal allergies  Recommended use of Flonase nasal spray on a daily basis along with symptomatic support with over-the-counter medications such as Claritin or Allegra    Orders:    CBC and differential           History of Present Illness   Patient presents for follow-up visit for hypertension.  His right shoulder is doing well postoperatively.  He has been released from physical therapy.  He has been having some increased symptoms of seasonal allergies.  Been intermittently using a nasal spray to help him sleep but otherwise he has no major issues.  Some labs were done.  Cholesterol is mildly elevated.  He is currently taking red yeast rice supplements.  Total cholesterol was 225.  HDL cholesterol 63 LDL cholesterol 137.  In these 2 issues she has no major concerns.  Blood pressure appears to be nicely controlled no problems with medications.      Review of Systems   Constitutional: Negative.  Negative for activity change, appetite change, chills, diaphoresis, fatigue, fever and unexpected weight change.   HENT: Negative.     Eyes: Negative.    Respiratory: Negative.     Cardiovascular: Negative.    Gastrointestinal: Negative.    Endocrine: Negative.    Genitourinary: Negative.    Musculoskeletal:  Positive for arthralgias (Right shoulder pain following recent surgery).  "  Skin: Negative.    Neurological: Negative.    Hematological: Negative.    Psychiatric/Behavioral:  The patient is not nervous/anxious.        Objective   /78 (BP Location: Left arm, Patient Position: Sitting, Cuff Size: Large)   Pulse 61   Temp (!) 97.4 °F (36.3 °C) (Temporal)   Ht 5' 11.38\" (1.813 m)   Wt 105 kg (231 lb)   SpO2 98%   BMI 31.88 kg/m²      Physical Exam  Vitals reviewed.   Constitutional:       General: He is not in acute distress.     Appearance: Normal appearance. He is not ill-appearing, toxic-appearing or diaphoretic.   HENT:      Head: Normocephalic and atraumatic.      Right Ear: External ear normal.      Left Ear: External ear normal.      Nose: Nose normal.      Mouth/Throat:      Mouth: Mucous membranes are moist.   Eyes:      General: No scleral icterus.     Conjunctiva/sclera: Conjunctivae normal.      Pupils: Pupils are equal, round, and reactive to light.   Cardiovascular:      Rate and Rhythm: Normal rate and regular rhythm.      Heart sounds: Normal heart sounds. No murmur heard.  Pulmonary:      Effort: Pulmonary effort is normal. No respiratory distress.      Breath sounds: Normal breath sounds.   Abdominal:      General: Abdomen is flat. There is no distension.   Musculoskeletal:         General: No swelling, tenderness or deformity.      Cervical back: Neck supple. No rigidity.      Right lower leg: No edema.      Left lower leg: No edema.   Skin:     General: Skin is warm.      Coloration: Skin is not jaundiced.      Findings: No bruising, erythema or rash.   Neurological:      General: No focal deficit present.      Mental Status: He is alert and oriented to person, place, and time. Mental status is at baseline.   Psychiatric:         Mood and Affect: Mood normal.         Behavior: Behavior normal.         Thought Content: Thought content normal.         Judgment: Judgment normal.         "

## 2025-03-19 NOTE — ASSESSMENT & PLAN NOTE
04-Jan-2025 14:59 Mildly elevated total cholesterol at 225 and minimally elevated LDL cholesterol 137.  HDL cholesterol is in a good range at 63.  The benefit by increasing her red yeast rice to 3 tablets daily.    Orders:    Lipid panel; Future     12-Jan-2025 17:55 06-Jan-2025 13:19 07-Jan-2025 17:46 09-Jan-2025 13:57

## 2025-03-19 NOTE — ASSESSMENT & PLAN NOTE
Recommended use of Flonase nasal spray on a daily basis along with symptomatic support with over-the-counter medications such as Claritin or Allegra    Orders:    CBC and differential

## 2025-03-19 NOTE — ASSESSMENT & PLAN NOTE
Blood pressure is stable on current medications.  No changes are needed.  Orders:    CBC and differential    Comprehensive metabolic panel

## 2025-07-02 ENCOUNTER — APPOINTMENT (OUTPATIENT)
Dept: URGENT CARE | Age: 65
End: 2025-07-02

## 2025-07-29 DIAGNOSIS — I10 ESSENTIAL HYPERTENSION: ICD-10-CM

## 2025-07-29 RX ORDER — LISINOPRIL 30 MG/1
30 TABLET ORAL DAILY
Qty: 30 TABLET | Refills: 5 | Status: SHIPPED | OUTPATIENT
Start: 2025-07-29

## 2025-08-20 LAB
ALBUMIN SERPL-MCNC: 4.6 G/DL (ref 3.5–5.7)
ALP SERPL-CCNC: 45 U/L (ref 35–120)
ALT SERPL-CCNC: 30 U/L
ANION GAP SERPL CALCULATED.3IONS-SCNC: 5 MMOL/L (ref 3–11)
ANISOCYTOSIS BLD QL SMEAR: SLIGHT
AST SERPL-CCNC: 25 U/L
BASOPHILS # BLD AUTO: 0 THOU/CMM (ref 0–0.1)
BASOPHILS NFR BLD AUTO: 1 %
BILIRUB SERPL-MCNC: 0.8 MG/DL (ref 0.2–1)
BUN SERPL-MCNC: 15 MG/DL (ref 7–28)
CALCIUM SERPL-MCNC: 9.9 MG/DL (ref 8.5–10.5)
CHLORIDE SERPL-SCNC: 100 MMOL/L (ref 100–109)
CHOLEST SERPL-MCNC: 210 MG/DL
CHOLEST/HDLC SERPL: 2.8 {RATIO}
CO2 SERPL-SCNC: 32 MMOL/L (ref 21–31)
CREAT SERPL-MCNC: 0.82 MG/DL (ref 0.53–1.3)
CYTOLOGY CMNT CVX/VAG CYTO-IMP: ABNORMAL
DIFFERENTIAL METHOD BLD: ABNORMAL
EOSINOPHIL # BLD AUTO: 0.2 THOU/CMM (ref 0–0.5)
EOSINOPHIL NFR BLD AUTO: 4 %
ERYTHROCYTE [DISTWIDTH] IN BLOOD BY AUTOMATED COUNT: 16.1 % (ref 12–16)
GFR/BSA.PRED SERPLBLD CYS-BASED-ARV: 99 ML/MIN/{1.73_M2}
GLUCOSE SERPL-MCNC: 93 MG/DL (ref 65–99)
HCT VFR BLD AUTO: 41.2 % (ref 37–48)
HDLC SERPL-MCNC: 74 MG/DL (ref 23–92)
HGB BLD-MCNC: 13.3 G/DL (ref 12.5–17)
HYPOCHROMIA BLD QL SMEAR: ABNORMAL
LDLC SERPL CALC-MCNC: 115 MG/DL
LYMPHOCYTES # BLD AUTO: 2 THOU/CMM (ref 1–3)
LYMPHOCYTES NFR BLD AUTO: 38 %
MCH RBC QN AUTO: 21.3 PG (ref 27–36)
MCHC RBC AUTO-ENTMCNC: 32.3 G/DL (ref 32–37)
MCV RBC AUTO: 66 FL (ref 80–100)
MICROCYTES BLD QL SMEAR: ABNORMAL
MONOCYTES # BLD AUTO: 0.6 THOU/CMM (ref 0.3–1)
MONOCYTES NFR BLD AUTO: 11 %
NEUTROPHILS # BLD AUTO: 2.5 THOU/CMM (ref 1.8–7.8)
NEUTROPHILS NFR BLD AUTO: 46 %
NONHDLC SERPL-MCNC: 136 MG/DL
PLATELET # BLD AUTO: 137 THOU/CMM (ref 140–350)
PMV BLD REES-ECKER: 9.5 FL (ref 7.5–11.3)
POTASSIUM SERPL-SCNC: 4.8 MMOL/L (ref 3.5–5.2)
PROT SERPL-MCNC: 7.1 G/DL (ref 6.3–8.3)
RBC # BLD AUTO: 6.23 MILL/CMM (ref 4–5.4)
SODIUM SERPL-SCNC: 137 MMOL/L (ref 135–145)
TRIGL SERPL-MCNC: 105 MG/DL
WBC # BLD AUTO: 5.3 THOU/CMM (ref 4–10.5)

## (undated) DEVICE — INTENDED FOR TISSUE SEPARATION, AND OTHER PROCEDURES THAT REQUIRE A SHARP SURGICAL BLADE TO PUNCTURE OR CUT.: Brand: BARD-PARKER SAFETY BLADES SIZE 15, STERILE

## (undated) DEVICE — CHLORAPREP HI-LITE 26ML ORANGE

## (undated) DEVICE — PLUMEPEN PRO 10FT

## (undated) DEVICE — PENROSE DRAIN, 18 X 3 8: Brand: CARDINAL HEALTH

## (undated) DEVICE — SUT PROLENE 2-0 SH 36 IN 8523H

## (undated) DEVICE — GLOVE INDICATOR PI UNDERGLOVE SZ 7 BLUE

## (undated) DEVICE — SUT VICRYL 3-0 SH 27 IN J416H

## (undated) DEVICE — BETHLEHEM UNIVERSAL MINOR GEN: Brand: CARDINAL HEALTH

## (undated) DEVICE — GLOVE SRG BIOGEL ECLIPSE 6.5

## (undated) DEVICE — SUT MONOCRYL 4-0 PS-2 18 IN Y496G

## (undated) DEVICE — SUT VICRYL 2-0 REEL 54 IN J286G

## (undated) DEVICE — NEEDLE 25G X 1 1/2

## (undated) DEVICE — ADHESIVE SKIN HIGH VISCOSITY EXOFIN 1ML

## (undated) DEVICE — SUT PDS PLUS 3-0 SH 27IN PDP316H